# Patient Record
Sex: FEMALE | Race: WHITE | Employment: FULL TIME | URBAN - METROPOLITAN AREA
[De-identification: names, ages, dates, MRNs, and addresses within clinical notes are randomized per-mention and may not be internally consistent; named-entity substitution may affect disease eponyms.]

---

## 2017-06-06 PROCEDURE — 86376 MICROSOMAL ANTIBODY EACH: CPT | Performed by: FAMILY MEDICINE

## 2017-06-21 PROCEDURE — 88175 CYTOPATH C/V AUTO FLUID REDO: CPT | Performed by: FAMILY MEDICINE

## 2017-06-21 PROCEDURE — 87624 HPV HI-RISK TYP POOLED RSLT: CPT | Performed by: FAMILY MEDICINE

## 2017-06-22 PROCEDURE — 80074 ACUTE HEPATITIS PANEL: CPT | Performed by: FAMILY MEDICINE

## 2017-07-04 PROBLEM — E08.65 DIABETES MELLITUS DUE TO UNDERLYING CONDITION, UNCONTROLLED, WITH HYPERGLYCEMIA, WITHOUT LONG-TERM CURRENT USE OF INSULIN (HCC): Status: ACTIVE | Noted: 2017-07-04

## 2017-07-10 PROCEDURE — 82103 ALPHA-1-ANTITRYPSIN TOTAL: CPT | Performed by: INTERNAL MEDICINE

## 2017-07-10 PROCEDURE — 83516 IMMUNOASSAY NONANTIBODY: CPT | Performed by: INTERNAL MEDICINE

## 2017-07-10 PROCEDURE — 82390 ASSAY OF CERULOPLASMIN: CPT | Performed by: INTERNAL MEDICINE

## 2017-07-10 PROCEDURE — 82784 ASSAY IGA/IGD/IGG/IGM EACH: CPT | Performed by: INTERNAL MEDICINE

## 2017-08-02 PROBLEM — E11.9 DIABETES MELLITUS TYPE 2 WITHOUT RETINOPATHY (HCC): Status: ACTIVE | Noted: 2017-08-02

## 2017-08-19 PROCEDURE — 88305 TISSUE EXAM BY PATHOLOGIST: CPT | Performed by: INTERNAL MEDICINE

## 2018-01-02 PROCEDURE — 82570 ASSAY OF URINE CREATININE: CPT | Performed by: INTERNAL MEDICINE

## 2018-01-02 PROCEDURE — 82043 UR ALBUMIN QUANTITATIVE: CPT | Performed by: INTERNAL MEDICINE

## 2018-09-06 ENCOUNTER — APPOINTMENT (OUTPATIENT)
Dept: ULTRASOUND IMAGING | Facility: HOSPITAL | Age: 58
DRG: 607 | End: 2018-09-06
Attending: EMERGENCY MEDICINE
Payer: COMMERCIAL

## 2018-09-06 ENCOUNTER — HOSPITAL ENCOUNTER (INPATIENT)
Facility: HOSPITAL | Age: 58
LOS: 2 days | Discharge: HOME | DRG: 607 | End: 2018-09-08
Attending: EMERGENCY MEDICINE | Admitting: HOSPITALIST
Payer: COMMERCIAL

## 2018-09-06 DIAGNOSIS — L03.116 LEFT LEG CELLULITIS: Primary | ICD-10-CM

## 2018-09-06 DIAGNOSIS — D61.818 PANCYTOPENIA (HCC): ICD-10-CM

## 2018-09-06 LAB
ANION GAP SERPL CALC-SCNC: 7 MMOL/L (ref 0–18)
BASOPHILS # BLD AUTO: 0.01 X10(3) UL (ref 0–0.1)
BASOPHILS NFR BLD AUTO: 0.3 %
BUN BLD-MCNC: 9 MG/DL (ref 8–20)
BUN/CREAT SERPL: 10.5 (ref 10–20)
CALCIUM BLD-MCNC: 8.8 MG/DL (ref 8.3–10.3)
CHLORIDE SERPL-SCNC: 102 MMOL/L (ref 101–111)
CO2 SERPL-SCNC: 24 MMOL/L (ref 22–32)
CREAT BLD-MCNC: 0.86 MG/DL (ref 0.55–1.02)
EOSINOPHIL # BLD AUTO: 0.03 X10(3) UL (ref 0–0.3)
EOSINOPHIL NFR BLD AUTO: 1 %
ERYTHROCYTE [DISTWIDTH] IN BLOOD BY AUTOMATED COUNT: 14.6 % (ref 11.5–16)
GLUCOSE BLD-MCNC: 114 MG/DL (ref 70–99)
HCT VFR BLD AUTO: 32.5 % (ref 34–50)
HGB BLD-MCNC: 10.8 G/DL (ref 12–16)
IMMATURE GRANULOCYTE COUNT: 0.01 X10(3) UL (ref 0–1)
IMMATURE GRANULOCYTE RATIO %: 0.3 %
LYMPHOCYTES # BLD AUTO: 0.47 X10(3) UL (ref 0.9–4)
LYMPHOCYTES NFR BLD AUTO: 15 %
MCH RBC QN AUTO: 30.7 PG (ref 27–33.2)
MCHC RBC AUTO-ENTMCNC: 33.2 G/DL (ref 31–37)
MCV RBC AUTO: 92.3 FL (ref 81–100)
MONOCYTES # BLD AUTO: 0.52 X10(3) UL (ref 0.1–1)
MONOCYTES NFR BLD AUTO: 16.6 %
NEUTROPHIL ABS PRELIM: 2.1 X10 (3) UL (ref 1.3–6.7)
NEUTROPHILS # BLD AUTO: 2.1 X10(3) UL (ref 1.3–6.7)
NEUTROPHILS NFR BLD AUTO: 66.8 %
OSMOLALITY SERPL CALC.SUM OF ELEC: 276 MOSM/KG (ref 275–295)
PLATELET # BLD AUTO: 59 10(3)UL (ref 150–450)
POTASSIUM SERPL-SCNC: 4 MMOL/L (ref 3.6–5.1)
RBC # BLD AUTO: 3.52 X10(6)UL (ref 3.8–5.1)
RED CELL DISTRIBUTION WIDTH-SD: 49.3 FL (ref 35.1–46.3)
SODIUM SERPL-SCNC: 133 MMOL/L (ref 136–144)
WBC # BLD AUTO: 3.1 X10(3) UL (ref 4–13)

## 2018-09-06 PROCEDURE — 87040 BLOOD CULTURE FOR BACTERIA: CPT | Performed by: EMERGENCY MEDICINE

## 2018-09-06 PROCEDURE — 80048 BASIC METABOLIC PNL TOTAL CA: CPT | Performed by: EMERGENCY MEDICINE

## 2018-09-06 PROCEDURE — 99285 EMERGENCY DEPT VISIT HI MDM: CPT

## 2018-09-06 PROCEDURE — 36415 COLL VENOUS BLD VENIPUNCTURE: CPT

## 2018-09-06 PROCEDURE — S0077 INJECTION, CLINDAMYCIN PHOSP: HCPCS | Performed by: EMERGENCY MEDICINE

## 2018-09-06 PROCEDURE — 96365 THER/PROPH/DIAG IV INF INIT: CPT

## 2018-09-06 PROCEDURE — 83036 HEMOGLOBIN GLYCOSYLATED A1C: CPT | Performed by: HOSPITALIST

## 2018-09-06 PROCEDURE — 85025 COMPLETE CBC W/AUTO DIFF WBC: CPT | Performed by: EMERGENCY MEDICINE

## 2018-09-06 PROCEDURE — 82728 ASSAY OF FERRITIN: CPT | Performed by: INTERNAL MEDICINE

## 2018-09-06 PROCEDURE — 93971 EXTREMITY STUDY: CPT | Performed by: EMERGENCY MEDICINE

## 2018-09-06 PROCEDURE — 83540 ASSAY OF IRON: CPT | Performed by: INTERNAL MEDICINE

## 2018-09-06 PROCEDURE — 83550 IRON BINDING TEST: CPT | Performed by: INTERNAL MEDICINE

## 2018-09-06 RX ORDER — CLINDAMYCIN PHOSPHATE 600 MG/50ML
600 INJECTION INTRAVENOUS ONCE
Status: COMPLETED | OUTPATIENT
Start: 2018-09-06 | End: 2018-09-06

## 2018-09-06 RX ORDER — SULFAMETHOXAZOLE AND TRIMETHOPRIM 800; 160 MG/1; MG/1
1 TABLET ORAL 2 TIMES DAILY
Status: ON HOLD | COMMUNITY
End: 2018-09-08

## 2018-09-06 RX ORDER — MONTELUKAST SODIUM 10 MG/1
10 TABLET ORAL DAILY
COMMUNITY

## 2018-09-06 RX ORDER — ATORVASTATIN CALCIUM 10 MG/1
10 TABLET, FILM COATED ORAL DAILY
COMMUNITY
End: 2019-07-16

## 2018-09-06 RX ORDER — MORPHINE SULFATE 4 MG/ML
1 INJECTION, SOLUTION INTRAMUSCULAR; INTRAVENOUS EVERY 2 HOUR PRN
Status: DISCONTINUED | OUTPATIENT
Start: 2018-09-06 | End: 2018-09-08

## 2018-09-06 RX ORDER — LEVOTHYROXINE SODIUM 0.05 MG/1
50 TABLET ORAL
COMMUNITY
End: 2018-11-16

## 2018-09-06 RX ORDER — ONDANSETRON 2 MG/ML
4 INJECTION INTRAMUSCULAR; INTRAVENOUS EVERY 6 HOURS PRN
Status: DISCONTINUED | OUTPATIENT
Start: 2018-09-06 | End: 2018-09-08

## 2018-09-06 RX ORDER — DIPHENHYDRAMINE HCL 25 MG
25 CAPSULE ORAL EVERY 6 HOURS PRN
Status: DISCONTINUED | OUTPATIENT
Start: 2018-09-06 | End: 2018-09-08

## 2018-09-06 RX ORDER — CLINDAMYCIN PHOSPHATE 600 MG/50ML
600 INJECTION INTRAVENOUS EVERY 8 HOURS
Status: DISCONTINUED | OUTPATIENT
Start: 2018-09-07 | End: 2018-09-06

## 2018-09-06 RX ORDER — ACETAMINOPHEN 325 MG/1
650 TABLET ORAL EVERY 6 HOURS PRN
Status: DISCONTINUED | OUTPATIENT
Start: 2018-09-06 | End: 2018-09-08

## 2018-09-06 RX ORDER — ENOXAPARIN SODIUM 100 MG/ML
40 INJECTION SUBCUTANEOUS NIGHTLY
Status: DISCONTINUED | OUTPATIENT
Start: 2018-09-06 | End: 2018-09-06

## 2018-09-06 RX ORDER — MORPHINE SULFATE 4 MG/ML
2 INJECTION, SOLUTION INTRAMUSCULAR; INTRAVENOUS EVERY 2 HOUR PRN
Status: DISCONTINUED | OUTPATIENT
Start: 2018-09-06 | End: 2018-09-08

## 2018-09-06 RX ORDER — METOCLOPRAMIDE HYDROCHLORIDE 5 MG/ML
10 INJECTION INTRAMUSCULAR; INTRAVENOUS EVERY 8 HOURS PRN
Status: DISCONTINUED | OUTPATIENT
Start: 2018-09-06 | End: 2018-09-08

## 2018-09-06 RX ORDER — ACETAMINOPHEN 500 MG
1000 TABLET ORAL ONCE
Status: COMPLETED | OUTPATIENT
Start: 2018-09-06 | End: 2018-09-06

## 2018-09-06 RX ORDER — CEPHALEXIN 500 MG/1
500 CAPSULE ORAL 3 TIMES DAILY
Status: ON HOLD | COMMUNITY
End: 2018-09-08

## 2018-09-06 RX ORDER — FLUTICASONE PROPIONATE AND SALMETEROL 500; 50 UG/1; UG/1
1 POWDER RESPIRATORY (INHALATION) 2 TIMES DAILY
COMMUNITY
End: 2019-06-10 | Stop reason: ALTCHOICE

## 2018-09-06 RX ORDER — ENOXAPARIN SODIUM 100 MG/ML
40 INJECTION SUBCUTANEOUS NIGHTLY
Status: DISCONTINUED | OUTPATIENT
Start: 2018-09-06 | End: 2018-09-06 | Stop reason: SDUPTHER

## 2018-09-06 RX ORDER — DIPHENHYDRAMINE HYDROCHLORIDE 25 MG/1
1 TABLET ORAL DAILY
COMMUNITY
End: 2020-11-19 | Stop reason: ALTCHOICE

## 2018-09-06 RX ORDER — MORPHINE SULFATE 4 MG/ML
4 INJECTION, SOLUTION INTRAMUSCULAR; INTRAVENOUS EVERY 2 HOUR PRN
Status: DISCONTINUED | OUTPATIENT
Start: 2018-09-06 | End: 2018-09-08

## 2018-09-06 NOTE — ED INITIAL ASSESSMENT (HPI)
Patient reports she was dx with cellulitis to her left lower leg about 9 days ago. Reports she has been on oral antibiotics, thought it was getting better until today.     + redness and swelling to left lower leg

## 2018-09-06 NOTE — ED PROVIDER NOTES
Patient Seen in: [facility] Emergency Department    History   Patient presents with:  Cellulitis (integumentary, infectious)    Stated Complaint: cellulitis    HPI    Patient has history of diabetes.   Patient notes that her symptoms started about 6 as noted above.     Physical Exam   ED Triage Vitals [09/06/18 1841]  BP: 136/67  Pulse: 106  Resp: 18  Temp: 100.4 °F (38 °C)  Temp src: Oral  SpO2: 98 %  O2 Device: None (Room air)    Current:/55   Pulse 94   Temp 98.4 °F (36.9 °C) (Oral)   Resp 18 RDW-SD 49.3 (*)     Lymphocyte Absolute 0.47 (*)     All other components within normal limits   CBC WITH DIFFERENTIAL WITH PLATELET    Narrative: The following orders were created for panel order CBC WITH DIFFERENTIAL WITH PLATELET.   Procedure [facility]    Disposition:  Admit  9/6/2018  8:40 pm    Follow-up:  No follow-up provider specified.       Medications Prescribed:  Current Discharge Medication List        Present on Admission  Date Reviewed: 7/26/2018          ICD-10-CM Noted POA    Left leg c

## 2018-09-07 ENCOUNTER — APPOINTMENT (OUTPATIENT)
Dept: ULTRASOUND IMAGING | Facility: HOSPITAL | Age: 58
DRG: 607 | End: 2018-09-07
Attending: INTERNAL MEDICINE
Payer: COMMERCIAL

## 2018-09-07 LAB
BASOPHILS # BLD AUTO: 0.01 X10(3) UL (ref 0–0.1)
BASOPHILS NFR BLD AUTO: 0.4 %
DEPRECATED HBV CORE AB SER IA-ACNC: 48.9 NG/ML (ref 18–340)
EOSINOPHIL # BLD AUTO: 0.05 X10(3) UL (ref 0–0.3)
EOSINOPHIL NFR BLD AUTO: 2.2 %
ERYTHROCYTE [DISTWIDTH] IN BLOOD BY AUTOMATED COUNT: 14.6 % (ref 11.5–16)
EST. AVERAGE GLUCOSE BLD GHB EST-MCNC: 137 MG/DL (ref 68–126)
GLUCOSE BLD-MCNC: 142 MG/DL (ref 65–99)
GLUCOSE BLD-MCNC: 158 MG/DL (ref 65–99)
GLUCOSE BLD-MCNC: 210 MG/DL (ref 65–99)
HBA1C MFR BLD HPLC: 6.4 % (ref ?–5.7)
HCT VFR BLD AUTO: 31.7 % (ref 34–50)
HGB BLD-MCNC: 10.2 G/DL (ref 12–16)
IMMATURE GRANULOCYTE COUNT: 0 X10(3) UL (ref 0–1)
IMMATURE GRANULOCYTE RATIO %: 0 %
IRON SATURATION: 15 % (ref 15–50)
IRON: 74 UG/DL (ref 28–170)
LYMPHOCYTES # BLD AUTO: 0.59 X10(3) UL (ref 0.9–4)
LYMPHOCYTES NFR BLD AUTO: 26.2 %
MCH RBC QN AUTO: 30.6 PG (ref 27–33.2)
MCHC RBC AUTO-ENTMCNC: 32.2 G/DL (ref 31–37)
MCV RBC AUTO: 95.2 FL (ref 81–100)
MONOCYTES # BLD AUTO: 0.57 X10(3) UL (ref 0.1–1)
MONOCYTES NFR BLD AUTO: 25.3 %
NEUTROPHIL ABS PRELIM: 1.03 X10 (3) UL (ref 1.3–6.7)
NEUTROPHILS # BLD AUTO: 1.03 X10(3) UL (ref 1.3–6.7)
NEUTROPHILS NFR BLD AUTO: 45.9 %
PLATELET # BLD AUTO: 57 10(3)UL (ref 150–450)
RBC # BLD AUTO: 3.33 X10(6)UL (ref 3.8–5.1)
RED CELL DISTRIBUTION WIDTH-SD: 50.9 FL (ref 35.1–46.3)
RETIC ABS CALC AUTO: 44.5 X10(3) UL (ref 22.5–147.5)
RETIC IRF CALC: 0.1 RATIO (ref 0.09–0.3)
RETIC%: 1.3 % (ref 0.5–2.5)
RETICULOCYTE HEMOGLOBIN EQUIVALENT: 33.7 PG (ref 28.2–36.3)
TOTAL IRON BINDING CAPACITY: 508 UG/DL (ref 240–450)
TRANSFERRIN SERPL-MCNC: 341 MG/DL (ref 200–360)
WBC # BLD AUTO: 2.3 X10(3) UL (ref 4–13)

## 2018-09-07 PROCEDURE — 76705 ECHO EXAM OF ABDOMEN: CPT | Performed by: INTERNAL MEDICINE

## 2018-09-07 PROCEDURE — 82962 GLUCOSE BLOOD TEST: CPT

## 2018-09-07 PROCEDURE — 85045 AUTOMATED RETICULOCYTE COUNT: CPT | Performed by: INTERNAL MEDICINE

## 2018-09-07 PROCEDURE — 85025 COMPLETE CBC W/AUTO DIFF WBC: CPT | Performed by: HOSPITALIST

## 2018-09-07 RX ORDER — LEVOTHYROXINE SODIUM 0.05 MG/1
50 TABLET ORAL
Status: DISCONTINUED | OUTPATIENT
Start: 2018-09-07 | End: 2018-09-08

## 2018-09-07 RX ORDER — MONTELUKAST SODIUM 10 MG/1
10 TABLET ORAL DAILY
Status: DISCONTINUED | OUTPATIENT
Start: 2018-09-07 | End: 2018-09-08

## 2018-09-07 RX ORDER — ATORVASTATIN CALCIUM 10 MG/1
10 TABLET, FILM COATED ORAL NIGHTLY
Status: DISCONTINUED | OUTPATIENT
Start: 2018-09-07 | End: 2018-09-08

## 2018-09-07 RX ORDER — DEXTROSE MONOHYDRATE 25 G/50ML
50 INJECTION, SOLUTION INTRAVENOUS
Status: DISCONTINUED | OUTPATIENT
Start: 2018-09-07 | End: 2018-09-08

## 2018-09-07 RX ORDER — HYDROXYZINE HYDROCHLORIDE 25 MG/1
25 TABLET, FILM COATED ORAL 3 TIMES DAILY
Status: DISCONTINUED | OUTPATIENT
Start: 2018-09-07 | End: 2018-09-08

## 2018-09-07 RX ORDER — DIPHENHYDRAMINE HYDROCHLORIDE 25 MG/1
1 TABLET ORAL DAILY
Status: DISCONTINUED | OUTPATIENT
Start: 2018-09-07 | End: 2018-09-07 | Stop reason: RX

## 2018-09-07 RX ORDER — FAMOTIDINE 20 MG/1
20 TABLET ORAL 2 TIMES DAILY
Status: DISCONTINUED | OUTPATIENT
Start: 2018-09-07 | End: 2018-09-08

## 2018-09-07 NOTE — CONSULTS
[facility]  Report of Consultation    Paola Eli Patient Status:  Inpatient    1960 MRN [id number]   [facility] 3NE-A Attending Emma Dodge MD   Hosp Day # 1 PCP Bel Singer MD     REASON FOR CONSULTATION: • Diabetes Mother    • Heart Disorder Mother      cabg   • Heart Disorder Maternal Grandmother    • Cancer Maternal Grandmother      small intestine cancer   • Lipids Sister    • Diabetes Brother      diet controlled diabetes   • Breast Cancer Cousin is clear. Neck: No JVD. No palpable lymphadenopathy. Neck is supple. Chest: Clear to auscultation. Heart: Regular rate and rhythm. Abdomen: Soft, non tender with good bowel sounds. Extremities: Pedal pulses are present. No edema.    Neurological: 0.10 x10(3) uL   Immature Granulocyte Absolute 0.01 0.00 - 1.00 x10(3) uL   Neutrophil % 66.8 %   Lymphocyte % 15.0 %   Monocyte % 16.6 %   Eosinophil % 1.0 %   Basophil % 0.3 %   Immature Granulocyte % 0.3 %         Component WBC RBC Hemoglobin   Latest R Imaging:    -      PROBLEM LIST:     Patient Active Problem List:     Asthma     Scleroderma ([address]     Hypothyroid     GERD (gastroesophageal reflux disease)     BMI 31.0-31.9,adult     Encounter for insertion of mirena IUD     Diabetes mellitus du

## 2018-09-07 NOTE — PROGRESS NOTES
[facility] dosing service    Initial Pharmacokinetic Consult for Vancomycin Dosing     Karey Brown is a 63year old female who is being treated for cellulitis.   Pharmacy has been asked to dose Vancomycin by Dr. Joseph Osullivan    She is allergic to cleoci appreciate the opportunity to assist in her care.     MONA López [facility]  9/7/2018  12:45 AM  [address] [place] [address] Extension: [phone]

## 2018-09-07 NOTE — PLAN OF CARE
A/O x 4.  Vanco Q 12 per MAR- first dose infusing  Denies pain  Benadryl PO PRN with relief of itching to LLE and chest/back  Ice pack to LLE  Carb controlled diet  Up ad bonifacio    METABOLIC/FLUID AND ELECTROLYTES - ADULT    • Glucose maintained within prescr

## 2018-09-07 NOTE — H&P
.  CC: Patient presents with:  Cellulitis (integumentary, infectious)       PCP: Orville Mcdowell MD    History of Present Illness: Patient is a 63year old female with diabetes who presented initially with redness and discomfort in LLE about 11 day Comment: Grade 1 esophageal varices, portal                hypertensive gastropathy, SB Bx taken  No date: LAPAROSCOPY PROCEDURE UNLISTED      Comment: ovarian cyst     ALL:    No Known Allergies           Home Medications:    Outpatient Prescriptions M 100.4 °F (38 °C) (Oral)   Resp 18   Ht 162.6 cm (5' 4\")   Wt 180 lb (81.6 kg)   SpO2 98%   BMI 30.90 kg/m²     Gen- NAD, appears stated age  [de-identified]- NCAT, anicteric sclera, MMM, OP clear  Lymph- no cervical LAD  CV- RRR no murmurs.  No DANETTE  Lungs- CTAB, goo effect of bactrim or related to ongoing infection    DM- normally on metformin and januvia. Hold.  accuchecks and SSI    Hypothyroid- levothyroxine    Hl- statin    Asthma- on chema Ladd MD  [facility] Hospitalist  Pager [phone]  Answering S

## 2018-09-07 NOTE — CONSULTS
INFECTIOUS DISEASE CONSULT NOTE    Breanne Negrete Patient Status:  Inpatient    1960 MRN [id number]   [facility] 3NE-A Attending Armani Navarro MD   Hosp Day # 1 PCP [place] Route diverticulosis  08/19/2017: EGD      Comment: Grade 1 esophageal varices, portal                hypertensive gastropathy, SB Bx taken  No date: LAPAROSCOPY PROCEDURE UNLISTED      Comment: ovarian cyst  Family History   Problem Relation Age of Onset   • Parag Larsen (PEPCID) tab 20 mg, 20 mg, Oral, BID  •  acetaminophen (TYLENOL) tab 650 mg, 650 mg, Oral, Q6H PRN  •  morphINE sulfate (PF) 4 MG/ML injection 1 mg, 1 mg, Intravenous, Q2H PRN **OR** morphINE sulfate (PF) 4 MG/ML injection 2 mg, 2 mg, Intravenous, Q2H PRN 9/6/2018  PROCEDURE:  US VENOUS DOPPLER LEG LEFT - DIAG IMG (CPT=93971)  COMPARISON:  None. INDICATIONS:  Pain swelling left leg  TECHNIQUE:  Real time, grey scale, and duplex ultrasound was used to evaluate the lower extremity venous system.  B-mode two-d she thinks that it was much better prior to the beginning of the rash.  Low grade fevers on admission could just be part of adverse reaction to abx    PLAN:    -cont empiric vanco for now but if remains afebrile and leg cellulitis seems resolved may not nee

## 2018-09-07 NOTE — PROGRESS NOTES
After IV Cleocin in ER pt noted to have hives on her chest that is itchy- noted by staff. [facility] hospitalist notified. Will continue to monitor.

## 2018-09-07 NOTE — PROGRESS NOTES
Pharmacy Note: Dietary Supplement Discontinuation Per Policy    Biotin has been discontinued on Stephani  per policy. This supplement may be restarted upon discharge using the medication reconciliation process.     Thank you,   Xiomara Cobb, [facility]

## 2018-09-07 NOTE — PAYOR COMM NOTE
--------------  ADMISSION REVIEW     Payor: [employer] [facility]  Subscriber #:  [id number]  Authorization Number: N/A    Admit date: 9/6/18  Admit time: 2104       Admitting Physician: Keaton Hollingsworth MD  Attending Physician:  Keaton Hollingsworth MD  Primary Care Physician diverticulosis  08/19/2017: EGD      Comment: Grade 1 esophageal varices, portal                hypertensive gastropathy, SB Bx taken  No date: LAPAROSCOPY PROCEDURE UNLISTED      Comment: ovarian cyst        Smoking status: Never Smoker petechial-like rash noted. No purpura. No ulcerations. No exudates. Dorsalis pedis pulse intact. Strength in the foot is excellent. The more proximal calf and popliteal fossa are nonswollen nontender. No tenderness or swelling over the knee.   No pollo Doppler  CONCLUSION:  Negative for DVT left leg  Admission disposition: 9/6/2018  8:40 PM         As noted above, patient has pancytopenia.   Disorders that produce disturbances and all 3 blood cell lines include myeloproliferative syndromes and myelodyspla female with diabetes who presented initially with redness and discomfort in LLE about 11 days ago. She describes it as a solid red band that had been across her lower shin above her ankle.  Prescribed bactrim by urgent  Care initially and then returned back Comment: ovarian cyst      ALL:     No Known Allergies            Home Medications:     Active Medications      Outpatient Prescriptions Marked as Taking for the 9/6/18 encounter [facility] Encounter):  Sulfamethoxazole-TMP -160 MG Oral Tab per tablet cm (5' 4\")   Wt 180 lb (81.6 kg)   SpO2 98%   BMI 30.90 kg/m²      Gen- NAD, appears stated age  [de-identified]- NCAT, anicteric sclera, MMM, OP clear  Lymph- no cervical LAD  CV- RRR no murmurs.  No DANETTE  Lungs- CTAB, good respiratory effort  Abd- soft, ntnd, no or related to ongoing infection     DM- normally on metformin and januvia. Hold.  accuchecks and SSI     Hypothyroid- levothyroxine     Hl- statin     Asthma- on chema Plummer MD  [facility] Hospitalist  Pager [phone]  Answering Service number

## 2018-09-08 VITALS
BODY MASS INDEX: 29.62 KG/M2 | RESPIRATION RATE: 16 BRPM | HEART RATE: 87 BPM | DIASTOLIC BLOOD PRESSURE: 63 MMHG | WEIGHT: 173.5 LBS | TEMPERATURE: 100 F | SYSTOLIC BLOOD PRESSURE: 113 MMHG | HEIGHT: 64 IN | OXYGEN SATURATION: 99 %

## 2018-09-08 LAB
BASOPHILS # BLD AUTO: 0.01 X10(3) UL (ref 0–0.1)
BASOPHILS NFR BLD AUTO: 0.4 %
CREAT BLD-MCNC: 0.69 MG/DL (ref 0.55–1.02)
EOSINOPHIL # BLD AUTO: 0.13 X10(3) UL (ref 0–0.3)
EOSINOPHIL NFR BLD AUTO: 5.2 %
ERYTHROCYTE [DISTWIDTH] IN BLOOD BY AUTOMATED COUNT: 14.5 % (ref 11.5–16)
GLUCOSE BLD-MCNC: 125 MG/DL (ref 65–99)
GLUCOSE BLD-MCNC: 125 MG/DL (ref 65–99)
HCT VFR BLD AUTO: 31.7 % (ref 34–50)
HGB BLD-MCNC: 9.9 G/DL (ref 12–16)
IMMATURE GRANULOCYTE COUNT: 0.01 X10(3) UL (ref 0–1)
IMMATURE GRANULOCYTE RATIO %: 0.4 %
LYMPHOCYTES # BLD AUTO: 0.72 X10(3) UL (ref 0.9–4)
LYMPHOCYTES NFR BLD AUTO: 28.6 %
MCH RBC QN AUTO: 29.6 PG (ref 27–33.2)
MCHC RBC AUTO-ENTMCNC: 31.2 G/DL (ref 31–37)
MCV RBC AUTO: 94.9 FL (ref 81–100)
MONOCYTES # BLD AUTO: 0.62 X10(3) UL (ref 0.1–1)
MONOCYTES NFR BLD AUTO: 24.6 %
NEUTROPHIL ABS PRELIM: 1.03 X10 (3) UL (ref 1.3–6.7)
NEUTROPHILS # BLD AUTO: 1.03 X10(3) UL (ref 1.3–6.7)
NEUTROPHILS NFR BLD AUTO: 40.8 %
PLATELET # BLD AUTO: 64 10(3)UL (ref 150–450)
RBC # BLD AUTO: 3.34 X10(6)UL (ref 3.8–5.1)
RED CELL DISTRIBUTION WIDTH-SD: 50.2 FL (ref 35.1–46.3)
VANCOMYCIN TROUGH SERPL-MCNC: 11.1 UG/ML (ref 10–20)
WBC # BLD AUTO: 2.5 X10(3) UL (ref 4–13)

## 2018-09-08 PROCEDURE — 83883 ASSAY NEPHELOMETRY NOT SPEC: CPT | Performed by: INTERNAL MEDICINE

## 2018-09-08 PROCEDURE — 87389 HIV-1 AG W/HIV-1&-2 AB AG IA: CPT | Performed by: INTERNAL MEDICINE

## 2018-09-08 PROCEDURE — 82565 ASSAY OF CREATININE: CPT | Performed by: HOSPITALIST

## 2018-09-08 PROCEDURE — 85025 COMPLETE CBC W/AUTO DIFF WBC: CPT | Performed by: HOSPITALIST

## 2018-09-08 PROCEDURE — 84165 PROTEIN E-PHORESIS SERUM: CPT | Performed by: INTERNAL MEDICINE

## 2018-09-08 PROCEDURE — 82962 GLUCOSE BLOOD TEST: CPT

## 2018-09-08 PROCEDURE — 86334 IMMUNOFIX E-PHORESIS SERUM: CPT | Performed by: INTERNAL MEDICINE

## 2018-09-08 PROCEDURE — 80202 ASSAY OF VANCOMYCIN: CPT | Performed by: HOSPITALIST

## 2018-09-08 RX ORDER — PREDNISONE 20 MG/1
40 TABLET ORAL
Qty: 8 TABLET | Refills: 0 | Status: SHIPPED | OUTPATIENT
Start: 2018-09-09 | End: 2018-09-13

## 2018-09-08 RX ORDER — FLUCONAZOLE 150 MG/1
150 TABLET ORAL ONCE
Qty: 1 TABLET | Refills: 0 | Status: SHIPPED | OUTPATIENT
Start: 2018-09-08 | End: 2018-09-08

## 2018-09-08 RX ORDER — HYDROXYZINE HYDROCHLORIDE 25 MG/1
25 TABLET, FILM COATED ORAL 3 TIMES DAILY
Qty: 30 TABLET | Refills: 0 | Status: SHIPPED | OUTPATIENT
Start: 2018-09-08 | End: 2019-01-04 | Stop reason: ALTCHOICE

## 2018-09-08 RX ORDER — LEVOFLOXACIN 500 MG/1
500 TABLET, FILM COATED ORAL DAILY
Qty: 7 TABLET | Refills: 0 | Status: SHIPPED | OUTPATIENT
Start: 2018-09-08 | End: 2018-09-15

## 2018-09-08 RX ORDER — PREDNISONE 20 MG/1
40 TABLET ORAL
Status: DISCONTINUED | OUTPATIENT
Start: 2018-09-08 | End: 2018-09-08

## 2018-09-08 NOTE — PLAN OF CARE
PT VERBALIZED UNDERSTANDING OF D/C MEDS, F/U APPOINTMNETS, SIDE EFFECTS OF MEDS. IV D/C'D WITH CATHETER INTACT. PT TO [facility] LOBBY VIA WHEELCHAIR AND TRANSPORT.

## 2018-09-08 NOTE — PROGRESS NOTES
[facility]                INFECTIOUS DISEASE PROGRESS NOTE    Ken Light Patient Status:  Inpatient    1960 MRN [id number]   [facility] 3NE-A Attending Nasir Tomlinson MD   Hosp Day # 2 PCP Kayleigh Yarbrough MD steroids    2.  LLE cellulits per history  Now with superimposed rash  If dc home po austin Joshi MD  [facility]  [phone]

## 2018-09-08 NOTE — PROGRESS NOTES
[facility] Hospitalist note    PCP: Paulie Ruiz MD    Chief Complaint:  Rash, LLE cellulitis    SUBJECTIVE:  Feeling well but still very itchy. Redness continues to dissipate in LLE but other lesions including chest/back remain the same.     OBJECTI predniSONE  40 mg Oral Daily with breakfast   • vancomycin  15 mg/kg Intravenous Q12H   • Insulin Aspart Pen  1-5 Units Subcutaneous TID CC and HS   • atorvastatin  10 mg Oral Nightly   • Fluticasone Furoate-Vilanterol  1 puff Inhalation Daily   • Levothyr

## 2018-09-08 NOTE — PLAN OF CARE
Patient still with hives/rash/itching. Benadryl/Atarax given for itching. Patient doesn't feel like the rash is getting better and is still extremely itchy. IV vanco continued. Trough level due before next dose. Patient currently resting.     METABOLIC/FLUI

## 2018-09-21 LAB
ALBUMIN SERPL-MCNC: 3.55 G/DL (ref 3.5–4.8)
ALBUMIN/GLOB SERPL: 1.13 {RATIO}
ALPHA1 GLOB SERPL ELPH-MCNC: 0.25 G/DL (ref 0.1–0.3)
ALPHA2 GLOB SERPL ELPH-MCNC: 0.67 G/DL (ref 0.6–1)
B-GLOBULIN SERPL ELPH-MCNC: 0.78 G/DL (ref 0.7–1.2)
GAMMA GLOB SERPL ELPH-MCNC: 1.45 G/DL (ref 0.6–1.6)
KAPPA FREE LIGHT CHAIN: 5.63 MG/DL (ref 0.33–1.94)
KAPPA/LAMBDA FLC RATIO: 1.65 (ref 0.26–1.65)
LAMBDA FREE LIGHT CHAIN: 3.41 MG/DL (ref 0.57–2.63)
MAI PROTEIN SERPL-MCNC: 6.7 G/DL (ref 6.1–8.3)

## 2019-01-04 PROCEDURE — 82043 UR ALBUMIN QUANTITATIVE: CPT | Performed by: FAMILY MEDICINE

## 2019-01-04 PROCEDURE — 82570 ASSAY OF URINE CREATININE: CPT | Performed by: FAMILY MEDICINE

## 2019-01-08 PROCEDURE — 83010 ASSAY OF HAPTOGLOBIN QUANT: CPT | Performed by: INTERNAL MEDICINE

## 2019-01-11 PROBLEM — E11.65 UNCONTROLLED TYPE 2 DIABETES MELLITUS WITH HYPERGLYCEMIA, WITHOUT LONG-TERM CURRENT USE OF INSULIN (HCC): Status: ACTIVE | Noted: 2019-01-11

## 2019-05-16 ENCOUNTER — APPOINTMENT (OUTPATIENT)
Dept: GENERAL RADIOLOGY | Age: 59
End: 2019-05-16
Attending: PHYSICIAN ASSISTANT
Payer: COMMERCIAL

## 2019-05-16 ENCOUNTER — APPOINTMENT (OUTPATIENT)
Dept: ULTRASOUND IMAGING | Age: 59
End: 2019-05-16
Attending: PHYSICIAN ASSISTANT
Payer: COMMERCIAL

## 2019-05-16 ENCOUNTER — HOSPITAL ENCOUNTER (OUTPATIENT)
Age: 59
Discharge: HOME | End: 2019-05-16
Payer: COMMERCIAL

## 2019-05-16 VITALS
HEART RATE: 89 BPM | TEMPERATURE: 99 F | SYSTOLIC BLOOD PRESSURE: 151 MMHG | DIASTOLIC BLOOD PRESSURE: 68 MMHG | RESPIRATION RATE: 17 BRPM | OXYGEN SATURATION: 98 %

## 2019-05-16 DIAGNOSIS — D64.9 ANEMIA, UNSPECIFIED TYPE: Primary | ICD-10-CM

## 2019-05-16 DIAGNOSIS — R22.41 LOCALIZED SWELLING OF RIGHT LOWER EXTREMITY: ICD-10-CM

## 2019-05-16 DIAGNOSIS — E11.65 TYPE 2 DIABETES MELLITUS WITH HYPERGLYCEMIA, WITHOUT LONG-TERM CURRENT USE OF INSULIN (HCC): ICD-10-CM

## 2019-05-16 PROCEDURE — 99204 OFFICE O/P NEW MOD 45 MIN: CPT

## 2019-05-16 PROCEDURE — 85025 COMPLETE CBC W/AUTO DIFF WBC: CPT | Performed by: PHYSICIAN ASSISTANT

## 2019-05-16 PROCEDURE — 93971 EXTREMITY STUDY: CPT | Performed by: PHYSICIAN ASSISTANT

## 2019-05-16 PROCEDURE — 80047 BASIC METABLC PNL IONIZED CA: CPT

## 2019-05-16 PROCEDURE — 84550 ASSAY OF BLOOD/URIC ACID: CPT | Performed by: PHYSICIAN ASSISTANT

## 2019-05-16 PROCEDURE — 73610 X-RAY EXAM OF ANKLE: CPT | Performed by: PHYSICIAN ASSISTANT

## 2019-05-16 PROCEDURE — 36415 COLL VENOUS BLD VENIPUNCTURE: CPT

## 2019-05-16 PROCEDURE — 99214 OFFICE O/P EST MOD 30 MIN: CPT

## 2019-05-16 NOTE — ED INITIAL ASSESSMENT (HPI)
Patient presents with 2 day h/o right lateral ankle swelling and redness started today.+cms. Palpable pulses. No h/o dvt. Denies shortness of breath.

## 2019-05-16 NOTE — ED PROVIDER NOTES
Patient Seen in: [facility] [place] [facility]    History   Patient presents with:  Swelling  Redness    Stated Complaint: leg redness    HPI    25-year-old female here with complaint of warmth redness and swelling to her right ankle that she note Yes      Alcohol/week: 0.0 oz      Comment: Social    Drug use: No      Review of Systems    Positive for stated complaint: leg redness  Other systems are as noted in HPI. Constitutional and vital signs reviewed.       All other systems reviewed and negati ISTAT CHEM8 CARTRIDGE - Abnormal; Notable for the following components:    ISTAT Creatinine 0.50 (*)     ISTAT Glucose 296 (*)     All other components within normal limits   URIC ACID, SERUM   CBC W AUTO DIFF   NOTE: [phone]  Xr Ankle (min 3 Views), Unremarkable without evidence of thrombus THROMBI:  None visible. COMPRESSION:  Normal compressibility, phasicity, and augmentation. OTHER:  Negative. CONCLUSION:  No evidence of deep venous thrombosis in the right lower extremity.    Dictated by: Mandeep Peterson Eliza Bertrand  [address] [facility]    Schedule an appointment as soon as possible for a visit           Medications Prescribed:  Discharge Medication List as of 5/16/2019  6:30 PM

## 2019-05-17 ENCOUNTER — TELEPHONE (OUTPATIENT)
Dept: URGENT CARE | Age: 59
End: 2019-05-17

## 2019-05-18 NOTE — ED NOTES
Called to patient and  verified for identification. Made aware of final CBC with auto diff result (confirmatory testing) and low uric acid result. Instructed to follow-up with Hematology-Oncology physician.   States did follow-up with Jaleesa Blake today and was diagnosed Cellulitis and was prescribed Augmentin

## 2019-05-25 ENCOUNTER — APPOINTMENT (OUTPATIENT)
Dept: ULTRASOUND IMAGING | Facility: HOSPITAL | Age: 59
End: 2019-05-25
Attending: EMERGENCY MEDICINE
Payer: COMMERCIAL

## 2019-05-25 ENCOUNTER — HOSPITAL ENCOUNTER (EMERGENCY)
Facility: HOSPITAL | Age: 59
Discharge: HOME | End: 2019-05-25
Attending: EMERGENCY MEDICINE
Payer: COMMERCIAL

## 2019-05-25 VITALS
BODY MASS INDEX: 29.02 KG/M2 | TEMPERATURE: 98 F | DIASTOLIC BLOOD PRESSURE: 92 MMHG | SYSTOLIC BLOOD PRESSURE: 151 MMHG | HEART RATE: 81 BPM | OXYGEN SATURATION: 99 % | HEIGHT: 64 IN | WEIGHT: 170 LBS | RESPIRATION RATE: 18 BRPM

## 2019-05-25 DIAGNOSIS — L03.115 CELLULITIS OF RIGHT LOWER EXTREMITY: Primary | ICD-10-CM

## 2019-05-25 PROCEDURE — 99284 EMERGENCY DEPT VISIT MOD MDM: CPT

## 2019-05-25 PROCEDURE — 93971 EXTREMITY STUDY: CPT | Performed by: EMERGENCY MEDICINE

## 2019-05-25 RX ORDER — DOXYCYCLINE HYCLATE 100 MG/1
100 CAPSULE ORAL 2 TIMES DAILY
Qty: 20 CAPSULE | Refills: 0 | Status: ON HOLD | OUTPATIENT
Start: 2019-05-25 | End: 2019-05-29

## 2019-05-25 NOTE — ED PROVIDER NOTES
Patient Seen in: [facility] Emergency Department    History   Patient presents with:  Cellulitis (integumentary, infectious)    Stated Complaint: celluitis right lower extremeity    HPI    50-year-old female here for redness of her right leg.   Elias Oh Temporal   SpO2 99 %   O2 Device None (Room air)       Current:BP (!) 151/92   Pulse 81   Temp 97.5 °F (36.4 °C) (Temporal)   Resp 18   Ht 162.6 cm (5' 4\")   Wt 77.1 kg   SpO2 99%   BMI 29.18 kg/m²         Physical Exam    Patient is alert orient x3 no ac

## 2019-05-28 ENCOUNTER — HOSPITAL ENCOUNTER (INPATIENT)
Facility: HOSPITAL | Age: 59
LOS: 1 days | Discharge: HOME | DRG: 378 | End: 2019-05-29
Attending: EMERGENCY MEDICINE | Admitting: INTERNAL MEDICINE
Payer: COMMERCIAL

## 2019-05-28 ENCOUNTER — ANESTHESIA EVENT (OUTPATIENT)
Dept: ENDOSCOPY | Facility: HOSPITAL | Age: 59
DRG: 378 | End: 2019-05-28
Payer: COMMERCIAL

## 2019-05-28 ENCOUNTER — ANESTHESIA (OUTPATIENT)
Dept: ENDOSCOPY | Facility: HOSPITAL | Age: 59
DRG: 378 | End: 2019-05-28
Payer: COMMERCIAL

## 2019-05-28 DIAGNOSIS — K92.2 UGI BLEED: Primary | ICD-10-CM

## 2019-05-28 DIAGNOSIS — F10.20 ALCOHOLISM (HCC): ICD-10-CM

## 2019-05-28 DIAGNOSIS — K92.0 HEMATEMESIS WITH NAUSEA: ICD-10-CM

## 2019-05-28 DIAGNOSIS — K29.01 ACUTE GASTRITIS WITH HEMORRHAGE, UNSPECIFIED GASTRITIS TYPE: ICD-10-CM

## 2019-05-28 DIAGNOSIS — I95.9 HYPOTENSION, UNSPECIFIED HYPOTENSION TYPE: ICD-10-CM

## 2019-05-28 PROBLEM — D64.9 ANEMIA: Status: ACTIVE | Noted: 2019-05-28

## 2019-05-28 PROBLEM — D69.6 THROMBOCYTOPENIA: Status: ACTIVE | Noted: 2019-05-28

## 2019-05-28 PROBLEM — D69.6 THROMBOCYTOPENIA (HCC): Status: ACTIVE | Noted: 2019-05-28

## 2019-05-28 PROBLEM — R73.9 HYPERGLYCEMIA: Status: ACTIVE | Noted: 2019-05-28

## 2019-05-28 PROBLEM — E87.1 HYPONATREMIA: Status: ACTIVE | Noted: 2019-05-28

## 2019-05-28 PROBLEM — R79.89 AZOTEMIA: Status: ACTIVE | Noted: 2019-05-28

## 2019-05-28 PROCEDURE — 82962 GLUCOSE BLOOD TEST: CPT

## 2019-05-28 PROCEDURE — 96365 THER/PROPH/DIAG IV INF INIT: CPT

## 2019-05-28 PROCEDURE — 82272 OCCULT BLD FECES 1-3 TESTS: CPT

## 2019-05-28 PROCEDURE — 87081 CULTURE SCREEN ONLY: CPT | Performed by: INTERNAL MEDICINE

## 2019-05-28 PROCEDURE — 85025 COMPLETE CBC W/AUTO DIFF WBC: CPT

## 2019-05-28 PROCEDURE — 96372 THER/PROPH/DIAG INJ SC/IM: CPT

## 2019-05-28 PROCEDURE — 80053 COMPREHEN METABOLIC PANEL: CPT

## 2019-05-28 PROCEDURE — 85025 COMPLETE CBC W/AUTO DIFF WBC: CPT | Performed by: EMERGENCY MEDICINE

## 2019-05-28 PROCEDURE — C9113 INJ PANTOPRAZOLE SODIUM, VIA: HCPCS | Performed by: INTERNAL MEDICINE

## 2019-05-28 PROCEDURE — 99291 CRITICAL CARE FIRST HOUR: CPT

## 2019-05-28 PROCEDURE — 86850 RBC ANTIBODY SCREEN: CPT | Performed by: EMERGENCY MEDICINE

## 2019-05-28 PROCEDURE — 86850 RBC ANTIBODY SCREEN: CPT

## 2019-05-28 PROCEDURE — 86920 COMPATIBILITY TEST SPIN: CPT

## 2019-05-28 PROCEDURE — 96361 HYDRATE IV INFUSION ADD-ON: CPT

## 2019-05-28 PROCEDURE — 99285 EMERGENCY DEPT VISIT HI MDM: CPT

## 2019-05-28 PROCEDURE — 0DJ08ZZ INSPECTION OF UPPER INTESTINAL TRACT, VIA NATURAL OR ARTIFICIAL OPENING ENDOSCOPIC: ICD-10-PCS | Performed by: INTERNAL MEDICINE

## 2019-05-28 PROCEDURE — 85018 HEMOGLOBIN: CPT | Performed by: INTERNAL MEDICINE

## 2019-05-28 PROCEDURE — 30233N1 TRANSFUSION OF NONAUTOLOGOUS RED BLOOD CELLS INTO PERIPHERAL VEIN, PERCUTANEOUS APPROACH: ICD-10-PCS | Performed by: INTERNAL MEDICINE

## 2019-05-28 PROCEDURE — 85730 THROMBOPLASTIN TIME PARTIAL: CPT | Performed by: EMERGENCY MEDICINE

## 2019-05-28 PROCEDURE — 86900 BLOOD TYPING SEROLOGIC ABO: CPT

## 2019-05-28 PROCEDURE — C9113 INJ PANTOPRAZOLE SODIUM, VIA: HCPCS | Performed by: EMERGENCY MEDICINE

## 2019-05-28 PROCEDURE — 36430 TRANSFUSION BLD/BLD COMPNT: CPT

## 2019-05-28 PROCEDURE — 86901 BLOOD TYPING SEROLOGIC RH(D): CPT | Performed by: EMERGENCY MEDICINE

## 2019-05-28 PROCEDURE — 80053 COMPREHEN METABOLIC PANEL: CPT | Performed by: EMERGENCY MEDICINE

## 2019-05-28 PROCEDURE — 86900 BLOOD TYPING SEROLOGIC ABO: CPT | Performed by: EMERGENCY MEDICINE

## 2019-05-28 PROCEDURE — 86901 BLOOD TYPING SEROLOGIC RH(D): CPT

## 2019-05-28 PROCEDURE — 85610 PROTHROMBIN TIME: CPT | Performed by: EMERGENCY MEDICINE

## 2019-05-28 RX ORDER — PANTOPRAZOLE SODIUM 40 MG/1
40 TABLET, DELAYED RELEASE ORAL
Status: DISCONTINUED | OUTPATIENT
Start: 2019-05-28 | End: 2019-05-29

## 2019-05-28 RX ORDER — SODIUM CHLORIDE 9 MG/ML
INJECTION, SOLUTION INTRAVENOUS ONCE
Status: DISCONTINUED | OUTPATIENT
Start: 2019-05-28 | End: 2019-05-29

## 2019-05-28 RX ORDER — MONTELUKAST SODIUM 10 MG/1
10 TABLET ORAL DAILY
Status: DISCONTINUED | OUTPATIENT
Start: 2019-05-29 | End: 2019-05-29

## 2019-05-28 RX ORDER — ACETAMINOPHEN 325 MG/1
650 TABLET ORAL EVERY 6 HOURS PRN
Status: DISCONTINUED | OUTPATIENT
Start: 2019-05-28 | End: 2019-05-29

## 2019-05-28 RX ORDER — ATORVASTATIN CALCIUM 10 MG/1
10 TABLET, FILM COATED ORAL DAILY
Status: DISCONTINUED | OUTPATIENT
Start: 2019-05-29 | End: 2019-05-29

## 2019-05-28 RX ORDER — SODIUM CHLORIDE 9 MG/ML
INJECTION, SOLUTION INTRAVENOUS CONTINUOUS
Status: DISCONTINUED | OUTPATIENT
Start: 2019-05-28 | End: 2019-05-28

## 2019-05-28 RX ORDER — PANTOPRAZOLE SODIUM 40 MG/1
40 TABLET, DELAYED RELEASE ORAL EVERY 12 HOURS
Status: DISCONTINUED | OUTPATIENT
Start: 2019-05-28 | End: 2019-05-28

## 2019-05-28 RX ORDER — ASPIRIN 81 MG/1
81 TABLET ORAL DAILY
Status: ON HOLD | COMMUNITY
End: 2019-05-29

## 2019-05-28 RX ORDER — ONDANSETRON 2 MG/ML
4 INJECTION INTRAMUSCULAR; INTRAVENOUS EVERY 4 HOURS PRN
Status: DISCONTINUED | OUTPATIENT
Start: 2019-05-28 | End: 2019-05-29

## 2019-05-28 RX ORDER — INSULIN ASPART 100 [IU]/ML
0.15 INJECTION, SOLUTION INTRAVENOUS; SUBCUTANEOUS ONCE
Status: COMPLETED | OUTPATIENT
Start: 2019-05-28 | End: 2019-05-28

## 2019-05-28 RX ORDER — LEVOTHYROXINE SODIUM 0.05 MG/1
50 TABLET ORAL
COMMUNITY
End: 2019-09-05

## 2019-05-28 RX ORDER — LEVOTHYROXINE SODIUM 0.05 MG/1
50 TABLET ORAL
Status: DISCONTINUED | OUTPATIENT
Start: 2019-05-29 | End: 2019-05-29

## 2019-05-28 NOTE — OPERATIVE REPORT
ENDOSCOPY OPERATIVE REPORT    Patient Name:  Jamil Rivas Record #: [id number]  YOB: 1960  Date of Procedure: 5/28/2019    Preoperative Diagnosis:  Hematemesis, acute blood loss anemia    Postoperative Diagnosis:  Non bleeding es as retroverted views of the fundus were obtained. Moderate severity portal htn gastropathy noted. No new/old blood noted anywhere during egd.      A normal pylorus was passed and the duodenal bulb entered, the duodenal bulb and post-bulbar duodenum were un

## 2019-05-28 NOTE — H&P
[facility] Hospitalist History and Physical      Patient presents with:  GI Bleeding (gastrointestinal)  Nausea/Vomiting/Diarrhea (gastrointestinal)  Dizziness (neurologic)       PCP: Nancie Aden MD      History of Present Illness: Patient is a 63 yea Comment:Rash       No current facility-administered medications on file prior to encounter. Current Outpatient Medications on File Prior to Encounter:  aspirin EC 81 MG Oral Tab EC Take 81 mg by mouth daily.  Disp:  Rfl:    Levothyroxine Sodium 50 MCG Ora 102   Temp 100.2 °F (37.9 °C)   Resp 19   Ht 5' 4\" (1.626 m)   Wt 170 lb (77.1 kg)   SpO2 100%   BMI 29.18 kg/m²   General:  Alert, no distress, appears stated age. Chronically ill appearing. Pale.    Head:  Normocephalic, without obvious abnormality, atr Technologist)  Patient has redness and swelling to the right ankle for one day, but did not have an injury. FINDINGS:  No evidence of acute displaced fracture or dislocation. Normal mineralization. Unremarkable soft tissues.   Chronic deformity of the d TECHNIQUE:  Real time, grey scale, and duplex ultrasound was used to evaluate the lower extremity venous system. B-mode two-dimensional images of the vascular structures, Doppler spectral analysis, and color flow. Doppler imaging were performed.   The foll accuchecks    #Asthma  - stable  - cont home inhalers    DVT prophy - SCD  Dispo: inpt care in the ICU. Plan of care d/w pt and  at bedside. Outpatient records or previous hospital records reviewed.    [facility] hospitalist to continue to follow patien

## 2019-05-28 NOTE — ANESTHESIA PREPROCEDURE EVALUATION
PRE-OP EVALUATION    Patient Name: Saira Cedillo    Pre-op Diagnosis: gi bleeding    Procedure(s):  Esophagogastroduodenoscopy to be done at bedside    Surgeon(s) and Role:     * Karla Carney MD - Primary    Pre-op vitals reviewed.   Temp: 100.2 °F MCG/DOSE Inhalation Aerosol Powder, Breath Activated Inhale 1 puff into the lungs 2 (two) times daily. Disp:  Rfl:    atorvastatin 10 MG Oral Tab Take 10 mg by mouth daily. Disp:  Rfl:    Biotin 5 MG Oral Cap Take 1 capsule by mouth daily.  Disp:  Rfl: Date     (L) 05/28/2019    K 4.2 05/28/2019     05/28/2019    CO2 23.0 05/28/2019    BUN 32 (H) 05/28/2019    CREATSERUM 0.88 05/28/2019     (H) 05/28/2019    CA 8.6 05/28/2019     Lab Results   Component Value Date    INR 1.31 (H) 05/28

## 2019-05-28 NOTE — ED PROVIDER NOTES
Patient Seen in: [facility] Emergency Department    History   Patient presents with:  GI Bleeding (gastrointestinal)  Nausea/Vomiting/Diarrhea (gastrointestinal)  Dizziness (neurologic)    Stated Complaint: vomiting blood, dizzy, elevated glucose, s Use      Smoking status: Never Smoker      Smokeless tobacco: Never Used    Alcohol use:  Yes      Alcohol/week: 0.0 oz      Comment: Social    Drug use: No      Review of Systems    Positive for stated complaint: vomiting blood, dizzy, elevated glucose, se Glucose 328 (*)     All other components within normal limits   CBC W/ DIFFERENTIAL - Abnormal; Notable for the following components:    RBC 2.17 (*)     HGB 6.5 (*)     HCT 19.8 (*)     .0 (*)     RDW 20.9 (*)     RDW-SD 70.5 (*)     All other comp the intensivist and the hospitalist.    Admission disposition: 5/28/2019 12:17 PM                 Disposition and Plan     Clinical Impression:  UGI bleed  (primary encounter diagnosis)  Alcoholism ([facility] [address]  Hypotension, unspecified hypotension type  Hemateme

## 2019-05-28 NOTE — ANESTHESIA POSTPROCEDURE EVALUATION
[address] Patient Status:  Inpatient   Age/Gender 63year old female MRN [id number]   [facility] 4SW-A Attending Vic Peck MD   Hosp Day # 0 PCP Nancie Aden MD       Anesthesia Post-op Note

## 2019-05-28 NOTE — CONSULTS
[address] Patient Status:  Inpatient    1960 MRN [id number]   [facility] 4SW-A Attending Carolina Ivey MD   Hosp Day # 0 PCP Krystal Serrano MD     Date of Admission: 2019  Admission Diagn [Sulfametho*    HIVES  Cleocin [Clindamyci*    ITCHING    Comment:Rash     Social History:   reports that she has never smoked. She has never used smokeless tobacco. She reports that she drinks alcohol. She reports that she does not use drugs.       Family **FOLLOWED BY** Octreotide Acetate (sandoSTATIN) 500 mcg in sodium chloride 0.9% 100 mL infusion, 50 mcg/hr, Intravenous, Continuous  •  Pantoprazole Sodium (PROTONIX) 80 mg in sodium chloride 0.9% 100 mL infusion, 8 mg/hr, Intravenous, Continuous  •  0.9% intolerance  Hematologic/lymphatic: denies easy bruising, new blood clots, or lymphedema  Allergic/immunologic: denies hay fever, hives, or new allergies      OBJECTIVE:  /47   Pulse 102   Temp 100.2 °F (37.9 °C)   Resp 19   Ht 5' 4\" (1.626 m)   Wt visualized all relevant chest imaging in PACS. I agree with the radiology interpretation except where noted. ASSESSMENT/PLAN:  1.  Acute Upper GI bleed with acute blood loss anemia   -IVF  -s/p EGD with non-bleeding esophageal varices noted as well a

## 2019-05-28 NOTE — CONSULTS
[facility]  Report of Consultation    Patrick Stevens Patient Status:  Emergency    1960 MRN [id number]   Location [address] [facility] Attending Andre Ruiz MD   Hosp Day # 0 PCP Kathie Flores MD     Reason for Co allergy to medication, and perforation among others. The recommendation for conscious sedation or anesthesia support for sedation was also discussed.   The patient was informed to avoid all aspirin products, plavix and coumadin for 5 days prior to the proc varices, portal hypertensive gastropathy, SB Bx taken   • LAPAROSCOPY PROCEDURE UNLISTED      ovarian cyst       Family History   Problem Relation Age of Onset   • Diabetes Father    • Diabetes Mother    • Heart Disorder Mother         cabg   • Heart Disor mg total) by mouth 3 (three) times daily. Disp: 270 tablet Rfl: 3   Montelukast Sodium 10 MG Oral Tab Take 10 mg by mouth daily.    Disp:  Rfl:    fluticasone-salmeterol 500-50 MCG/DOSE Inhalation Aerosol Powder, Breath Activated Inhale 1 puff into the lung masses, HSM or tenderness, soft, nondistended, no G/R/R , no obvious ascites  RECTAL: done in presence of one of the ER patient tech's (Sussy), dark green/black stool. No overt blood  EXTREMITIES: mild erythema rt lower ext. No pain on palpation.  No edema and/or treatment. --the spouse and patient demonstrated understanding of the results and recommendations and options.   All of their questions and concerns were addressed and were agreeable to the plan as listed        Norbert Busch  5/28/2019  12:33

## 2019-05-29 ENCOUNTER — APPOINTMENT (OUTPATIENT)
Dept: ULTRASOUND IMAGING | Facility: HOSPITAL | Age: 59
DRG: 378 | End: 2019-05-29
Attending: INTERNAL MEDICINE
Payer: COMMERCIAL

## 2019-05-29 VITALS
OXYGEN SATURATION: 98 % | WEIGHT: 166 LBS | HEART RATE: 79 BPM | SYSTOLIC BLOOD PRESSURE: 118 MMHG | DIASTOLIC BLOOD PRESSURE: 63 MMHG | TEMPERATURE: 98 F | HEIGHT: 64 IN | BODY MASS INDEX: 28.34 KG/M2 | RESPIRATION RATE: 16 BRPM

## 2019-05-29 PROCEDURE — 85025 COMPLETE CBC W/AUTO DIFF WBC: CPT | Performed by: INTERNAL MEDICINE

## 2019-05-29 PROCEDURE — 84100 ASSAY OF PHOSPHORUS: CPT | Performed by: NURSE PRACTITIONER

## 2019-05-29 PROCEDURE — 76700 US EXAM ABDOM COMPLETE: CPT | Performed by: INTERNAL MEDICINE

## 2019-05-29 PROCEDURE — 85018 HEMOGLOBIN: CPT | Performed by: INTERNAL MEDICINE

## 2019-05-29 PROCEDURE — 85610 PROTHROMBIN TIME: CPT | Performed by: NURSE PRACTITIONER

## 2019-05-29 PROCEDURE — 83735 ASSAY OF MAGNESIUM: CPT | Performed by: NURSE PRACTITIONER

## 2019-05-29 PROCEDURE — 85730 THROMBOPLASTIN TIME PARTIAL: CPT | Performed by: NURSE PRACTITIONER

## 2019-05-29 PROCEDURE — 80053 COMPREHEN METABOLIC PANEL: CPT | Performed by: INTERNAL MEDICINE

## 2019-05-29 PROCEDURE — 97161 PT EVAL LOW COMPLEX 20 MIN: CPT

## 2019-05-29 PROCEDURE — 94640 AIRWAY INHALATION TREATMENT: CPT

## 2019-05-29 PROCEDURE — 82962 GLUCOSE BLOOD TEST: CPT

## 2019-05-29 RX ORDER — CEFADROXIL 500 MG/1
500 CAPSULE ORAL 2 TIMES DAILY
Qty: 6 CAPSULE | Refills: 0 | Status: SHIPPED | OUTPATIENT
Start: 2019-05-29 | End: 2019-06-01

## 2019-05-29 RX ORDER — MAGNESIUM OXIDE 400 MG (241.3 MG MAGNESIUM) TABLET
400 TABLET ONCE
Status: COMPLETED | OUTPATIENT
Start: 2019-05-29 | End: 2019-05-29

## 2019-05-29 RX ORDER — PANTOPRAZOLE SODIUM 40 MG/1
40 TABLET, DELAYED RELEASE ORAL
Qty: 30 TABLET | Refills: 0 | Status: SHIPPED | OUTPATIENT
Start: 2019-05-29 | End: 2019-06-28

## 2019-05-29 NOTE — PLAN OF CARE
Problem: HEMATOLOGIC - ADULT  Goal: Maintains hematologic stability  Description  INTERVENTIONS  - Assess for signs and symptoms of bleeding or hemorrhage  - Monitor labs and vital signs for trends  - Administer supportive blood products/factors, fluids

## 2019-05-29 NOTE — PLAN OF CARE
Assumed pt care this morning. Aa/ox4. Breathing unlabored. Denies pain. Tolerating clear liquids. Up ad bonifacio with steady gait. Per Dr. Hernan Aguayo advance diet as tolerated and may d/c home later today if tolerates. Will transfer out of ICU for now.

## 2019-05-29 NOTE — PROGRESS NOTES
[facility]  Progress Note    Breanne Negrete Patient Status:  Inpatient    1960 MRN [id number]   [facility] 4SW-A Attending Xavier Stanford MD   Hosp Day # 1 PCP Kirill Parra MD     Subjective:  Breanne Negrete 05/28/19  1045  05/29/19  0303   RBC 2.17*  --  2.39*   HGB 6.5*   < > 7.2*   HCT 19.8*  --  21.4*   MCV 91.2  --  89.5   MCH 30.0  --  30.1   MCHC 32.8  --  33.6   RDW 20.9*  --  20.8*   NEPRELIM 4.43  --  2.18   WBC 7.2  --  4.5   .0*  --  74.0* Acute gastritis with hemorrhage, unspecified gastritis type          1 ugi bleeding  2 non bleeding esoph varices  3 gastric erosion  4 portal htn gastropathy  5 hematemesis  6 acute blood loss anemia and chronic blood loss anemia    Hg stable.  Bun improve

## 2019-05-29 NOTE — CM/SW NOTE
05/29/19 1400   CM/SW Screening   Referral Source Social Work (self-referral);    [address] staff; Chart review;Nursing rounds   Patient's Mental Status Alert;Oriented   Patient lives with Spouse     Patient was screened during ro

## 2019-05-29 NOTE — DISCHARGE SUMMARY
General Medicine Discharge Summary     Patient ID:  Paola Benitez  63year old  5/23/1960    Admit date: 5/28/2019    Discharge date and time: 5/29/2019    Attending Physician: Rawland Cabot THERAPY    Operative Procedures: Procedure(s) (LRB):  ESOPHAGOGASTRODUODENOSCOPY (EGD) (N/A)       Patient instructions:      Current Discharge Medication List    START taking these medications    Pantoprazole Sodium 40 MG Oral Tab EC  Take 1 tablet (40 mg

## 2019-05-29 NOTE — PHYSICAL THERAPY NOTE
PHYSICAL THERAPY QUICK EVALUATION - INPATIENT    Room Number: 454/454-A  Evaluation Date: 5/29/2019  Presenting Problem: UGI bleed, weakness  Physician Order: PT Eval and Treat     History Related to Current Admission: Pt admitted through ER w/ c/o of vo w/ adl's, gait w/o device, drives and works as a teacher.     SUBJECTIVE  \"I'm feeling good\"    OBJECTIVE     Fall Risk: Standard fall risk    WEIGHT BEARING RESTRICTION  Weight Bearing Restriction: None                PAIN ASSESSMENT  Ratin         R normal gait pattern, w/o lob - independent. Pt completed flight of stairs using step over step pattern and use of railing and modified independence. Pt completed Modified Holcomb balance exam - see below for details. Modified Holcomb Balance Test    1.  Sitt to ambulate on level surfaces Safely and independently

## 2019-05-29 NOTE — BH PROGRESS NOTE
Patient seen for her etoh history. She said, she drinks up to a 1/2 pint of vodka daily. She said, she started out drinking due to not being able to sleep. She admits this turned into enjoying the feeling of relaxation after a drink.   The pt talked abou

## 2019-05-29 NOTE — PROGRESS NOTES
Pt arrived from ICU, pt is A/O x4. RA lungs clear. Denies pain. Up self and is steady, right lower extremity is swollen, red.  Will continue to monitor

## 2019-05-29 NOTE — PROGRESS NOTES
Eunice Perera Patient Status:  Inpatient    1960 MRN [id number]   [facility] 4SW-A Attending Shana Lesches, MD   Hosp Day # 1 PCP Ileana Gustafson MD     Critical Care Progress Note      Assessment/Plan:  1.  Acute Up clear   Chest wall: No tenderness or deformity. Heart: Regular rate and rhythm, normal S1S2, no  murmur. Abdomen: soft, non-tender, non-distended, positive BS. Extremity: No clubbing or cyanosis no edema. Skin: No rashes or lesions.     Recent Labs

## 2019-05-29 NOTE — PLAN OF CARE
Assumed care of patient for night shift. Patient A/O x4. Able to walk to bathroom, stable gait. No s/s of bleeding. Monitoring Hgb blood draws. Room air. NSR on monitor. BP slightly decreased, patient states this is her baseline.  CIWAs continue to be negat

## 2019-06-10 PROBLEM — D61.818 PANCYTOPENIA (HCC): Status: RESOLVED | Noted: 2018-09-06 | Resolved: 2019-06-10

## 2019-06-10 PROBLEM — E08.65 DIABETES MELLITUS DUE TO UNDERLYING CONDITION, UNCONTROLLED, WITH HYPERGLYCEMIA, WITHOUT LONG-TERM CURRENT USE OF INSULIN (HCC): Status: RESOLVED | Noted: 2017-07-04 | Resolved: 2019-06-10

## 2019-06-28 PROBLEM — D50.8 IRON DEFICIENCY ANEMIA REFRACTORY TO IRON THERAPY: Status: ACTIVE | Noted: 2019-06-28

## 2019-07-08 ENCOUNTER — HOSPITAL ENCOUNTER (INPATIENT)
Facility: HOSPITAL | Age: 59
LOS: 2 days | Discharge: HOME | DRG: 442 | End: 2019-07-10
Attending: EMERGENCY MEDICINE | Admitting: INTERNAL MEDICINE
Payer: COMMERCIAL

## 2019-07-08 ENCOUNTER — ANESTHESIA EVENT (OUTPATIENT)
Dept: ENDOSCOPY | Facility: HOSPITAL | Age: 59
End: 2019-07-08

## 2019-07-08 DIAGNOSIS — E78.2 MIXED HYPERLIPIDEMIA: ICD-10-CM

## 2019-07-08 DIAGNOSIS — E11.65 TYPE 2 DIABETES MELLITUS WITH HYPERGLYCEMIA, WITHOUT LONG-TERM CURRENT USE OF INSULIN (HCC): ICD-10-CM

## 2019-07-08 DIAGNOSIS — I85.00 ESOPHAGEAL VARICES DETERMINED BY ENDOSCOPY (HCC): ICD-10-CM

## 2019-07-08 DIAGNOSIS — K92.2 UPPER GI BLEEDING: Primary | ICD-10-CM

## 2019-07-08 DIAGNOSIS — D62 ANEMIA DUE TO ACUTE BLOOD LOSS: ICD-10-CM

## 2019-07-08 DIAGNOSIS — E11.65 UNCONTROLLED TYPE 2 DIABETES MELLITUS WITH HYPERGLYCEMIA, WITHOUT LONG-TERM CURRENT USE OF INSULIN (HCC): ICD-10-CM

## 2019-07-08 PROBLEM — E87.2 METABOLIC ACIDOSIS: Status: ACTIVE | Noted: 2019-07-08

## 2019-07-08 PROBLEM — E87.20 METABOLIC ACIDOSIS: Status: ACTIVE | Noted: 2019-07-08

## 2019-07-08 LAB
ALBUMIN SERPL-MCNC: 2.7 G/DL (ref 3.4–5)
ALBUMIN/GLOB SERPL: 0.7 {RATIO} (ref 1–2)
ALP LIVER SERPL-CCNC: 135 U/L (ref 46–118)
ALT SERPL-CCNC: 30 U/L (ref 13–56)
ANION GAP SERPL CALC-SCNC: 9 MMOL/L (ref 0–18)
ANTIBODY SCREEN: NEGATIVE
AST SERPL-CCNC: 47 U/L (ref 15–37)
BASOPHILS # BLD AUTO: 0.02 X10(3) UL (ref 0–0.2)
BASOPHILS NFR BLD AUTO: 0.4 %
BILIRUB SERPL-MCNC: 1.7 MG/DL (ref 0.1–2)
BUN BLD-MCNC: 19 MG/DL (ref 7–18)
BUN/CREAT SERPL: 23.5 (ref 10–20)
CALCIUM BLD-MCNC: 9.6 MG/DL (ref 8.5–10.1)
CHLORIDE SERPL-SCNC: 105 MMOL/L (ref 98–112)
CHOLEST SMN-MCNC: 111 MG/DL (ref ?–200)
CO2 SERPL-SCNC: 21 MMOL/L (ref 21–32)
CREAT BLD-MCNC: 0.81 MG/DL (ref 0.55–1.02)
DEPRECATED RDW RBC AUTO: 63.4 FL (ref 35.1–46.3)
DEPRECATED RDW RBC AUTO: 68.1 FL (ref 35.1–46.3)
EOSINOPHIL # BLD AUTO: 0.05 X10(3) UL (ref 0–0.7)
EOSINOPHIL NFR BLD AUTO: 1 %
ERYTHROCYTE [DISTWIDTH] IN BLOOD BY AUTOMATED COUNT: 22.9 % (ref 11–15)
ERYTHROCYTE [DISTWIDTH] IN BLOOD BY AUTOMATED COUNT: 24.1 % (ref 11–15)
EST. AVERAGE GLUCOSE BLD GHB EST-MCNC: 232 MG/DL (ref 68–126)
ETHANOL SERPL-MCNC: <3 MG/DL (ref ?–3)
GLOBULIN PLAS-MCNC: 3.9 G/DL (ref 2.8–4.4)
GLUCOSE BLD-MCNC: 222 MG/DL (ref 70–99)
GLUCOSE BLD-MCNC: 230 MG/DL (ref 70–99)
GLUCOSE BLD-MCNC: 324 MG/DL (ref 70–99)
GLUCOSE BLD-MCNC: 387 MG/DL (ref 70–99)
HBA1C MFR BLD HPLC: 9.7 % (ref ?–5.7)
HCT VFR BLD AUTO: 23.2 % (ref 35–48)
HCT VFR BLD AUTO: 23.8 % (ref 35–48)
HDLC SERPL-MCNC: 28 MG/DL (ref 40–59)
HGB BLD-MCNC: 7.3 G/DL (ref 12–16)
HGB BLD-MCNC: 7.7 G/DL (ref 12–16)
IMM GRANULOCYTES # BLD AUTO: 0.02 X10(3) UL (ref 0–1)
IMM GRANULOCYTES NFR BLD: 0.4 %
INR BLD: 1.31 (ref 0.9–1.1)
LDLC SERPL CALC-MCNC: 55 MG/DL (ref ?–100)
LYMPHOCYTES # BLD AUTO: 0.87 X10(3) UL (ref 1–4)
LYMPHOCYTES NFR BLD AUTO: 17.9 %
M PROTEIN MFR SERPL ELPH: 6.6 G/DL (ref 6.4–8.2)
MCH RBC QN AUTO: 26.7 PG (ref 26–34)
MCH RBC QN AUTO: 27.8 PG (ref 26–34)
MCHC RBC AUTO-ENTMCNC: 31.5 G/DL (ref 31–37)
MCHC RBC AUTO-ENTMCNC: 32.4 G/DL (ref 31–37)
MCV RBC AUTO: 85 FL (ref 80–100)
MCV RBC AUTO: 85.9 FL (ref 80–100)
MONOCYTES # BLD AUTO: 0.71 X10(3) UL (ref 0.1–1)
MONOCYTES NFR BLD AUTO: 14.6 %
NEUTROPHILS # BLD AUTO: 3.2 X10 (3) UL (ref 1.5–7.7)
NEUTROPHILS # BLD AUTO: 3.2 X10(3) UL (ref 1.5–7.7)
NEUTROPHILS NFR BLD AUTO: 65.7 %
NONHDLC SERPL-MCNC: 83 MG/DL (ref ?–130)
OSMOLALITY SERPL CALC.SUM OF ELEC: 298 MOSM/KG (ref 275–295)
PLATELET # BLD AUTO: 115 10(3)UL (ref 150–450)
PLATELET # BLD AUTO: 81 10(3)UL (ref 150–450)
PLATELET MORPHOLOGY: NORMAL
POTASSIUM SERPL-SCNC: 4.3 MMOL/L (ref 3.5–5.1)
PSA SERPL DL<=0.01 NG/ML-MCNC: 16.9 SECONDS (ref 12.5–14.7)
RBC # BLD AUTO: 2.73 X10(6)UL (ref 3.8–5.3)
RBC # BLD AUTO: 2.77 X10(6)UL (ref 3.8–5.3)
RH BLOOD TYPE: POSITIVE
SODIUM SERPL-SCNC: 135 MMOL/L (ref 136–145)
TRIGL SERPL-MCNC: 141 MG/DL (ref 30–149)
TSI SER-ACNC: 1.81 MIU/ML (ref 0.36–3.74)
VLDLC SERPL CALC-MCNC: 28 MG/DL (ref 0–30)
WBC # BLD AUTO: 4.5 X10(3) UL (ref 4–11)
WBC # BLD AUTO: 4.9 X10(3) UL (ref 4–11)

## 2019-07-08 PROCEDURE — 96375 TX/PRO/DX INJ NEW DRUG ADDON: CPT

## 2019-07-08 PROCEDURE — 83036 HEMOGLOBIN GLYCOSYLATED A1C: CPT | Performed by: INTERNAL MEDICINE

## 2019-07-08 PROCEDURE — 84443 ASSAY THYROID STIM HORMONE: CPT | Performed by: INTERNAL MEDICINE

## 2019-07-08 PROCEDURE — 86901 BLOOD TYPING SEROLOGIC RH(D): CPT | Performed by: EMERGENCY MEDICINE

## 2019-07-08 PROCEDURE — 85025 COMPLETE CBC W/AUTO DIFF WBC: CPT

## 2019-07-08 PROCEDURE — 36430 TRANSFUSION BLD/BLD COMPNT: CPT

## 2019-07-08 PROCEDURE — 87081 CULTURE SCREEN ONLY: CPT | Performed by: INTERNAL MEDICINE

## 2019-07-08 PROCEDURE — 96365 THER/PROPH/DIAG IV INF INIT: CPT

## 2019-07-08 PROCEDURE — 86900 BLOOD TYPING SEROLOGIC ABO: CPT | Performed by: EMERGENCY MEDICINE

## 2019-07-08 PROCEDURE — C9113 INJ PANTOPRAZOLE SODIUM, VIA: HCPCS | Performed by: EMERGENCY MEDICINE

## 2019-07-08 PROCEDURE — 96361 HYDRATE IV INFUSION ADD-ON: CPT

## 2019-07-08 PROCEDURE — 99285 EMERGENCY DEPT VISIT HI MDM: CPT

## 2019-07-08 PROCEDURE — 80053 COMPREHEN METABOLIC PANEL: CPT | Performed by: EMERGENCY MEDICINE

## 2019-07-08 PROCEDURE — 86920 COMPATIBILITY TEST SPIN: CPT

## 2019-07-08 PROCEDURE — 85025 COMPLETE CBC W/AUTO DIFF WBC: CPT | Performed by: EMERGENCY MEDICINE

## 2019-07-08 PROCEDURE — 86900 BLOOD TYPING SEROLOGIC ABO: CPT

## 2019-07-08 PROCEDURE — 86850 RBC ANTIBODY SCREEN: CPT | Performed by: EMERGENCY MEDICINE

## 2019-07-08 PROCEDURE — 85018 HEMOGLOBIN: CPT | Performed by: INTERNAL MEDICINE

## 2019-07-08 PROCEDURE — 82962 GLUCOSE BLOOD TEST: CPT

## 2019-07-08 PROCEDURE — 80053 COMPREHEN METABOLIC PANEL: CPT

## 2019-07-08 PROCEDURE — 86901 BLOOD TYPING SEROLOGIC RH(D): CPT

## 2019-07-08 PROCEDURE — 85027 COMPLETE CBC AUTOMATED: CPT | Performed by: INTERNAL MEDICINE

## 2019-07-08 PROCEDURE — 96368 THER/DIAG CONCURRENT INF: CPT

## 2019-07-08 PROCEDURE — 80320 DRUG SCREEN QUANTALCOHOLS: CPT | Performed by: INTERNAL MEDICINE

## 2019-07-08 PROCEDURE — 30233N1 TRANSFUSION OF NONAUTOLOGOUS RED BLOOD CELLS INTO PERIPHERAL VEIN, PERCUTANEOUS APPROACH: ICD-10-PCS | Performed by: INTERNAL MEDICINE

## 2019-07-08 PROCEDURE — 80061 LIPID PANEL: CPT | Performed by: INTERNAL MEDICINE

## 2019-07-08 PROCEDURE — 85610 PROTHROMBIN TIME: CPT | Performed by: INTERNAL MEDICINE

## 2019-07-08 PROCEDURE — 86850 RBC ANTIBODY SCREEN: CPT

## 2019-07-08 RX ORDER — ONDANSETRON 2 MG/ML
4 INJECTION INTRAMUSCULAR; INTRAVENOUS EVERY 4 HOURS PRN
Status: DISCONTINUED | OUTPATIENT
Start: 2019-07-08 | End: 2019-07-10

## 2019-07-08 RX ORDER — ALBUTEROL SULFATE 90 UG/1
1 AEROSOL, METERED RESPIRATORY (INHALATION) EVERY 6 HOURS PRN
COMMUNITY

## 2019-07-08 RX ORDER — SODIUM CHLORIDE 9 MG/ML
INJECTION, SOLUTION INTRAVENOUS CONTINUOUS
Status: DISCONTINUED | OUTPATIENT
Start: 2019-07-08 | End: 2019-07-08

## 2019-07-08 RX ORDER — ONDANSETRON 2 MG/ML
4 INJECTION INTRAMUSCULAR; INTRAVENOUS ONCE
Status: COMPLETED | OUTPATIENT
Start: 2019-07-08 | End: 2019-07-08

## 2019-07-08 RX ORDER — ALBUTEROL SULFATE 90 UG/1
1 AEROSOL, METERED RESPIRATORY (INHALATION) EVERY 6 HOURS PRN
Status: DISCONTINUED | OUTPATIENT
Start: 2019-07-08 | End: 2019-07-10

## 2019-07-08 RX ORDER — DEXTROSE MONOHYDRATE 25 G/50ML
50 INJECTION, SOLUTION INTRAVENOUS
Status: DISCONTINUED | OUTPATIENT
Start: 2019-07-08 | End: 2019-07-10

## 2019-07-08 RX ORDER — DEXTROSE AND SODIUM CHLORIDE 5; .45 G/100ML; G/100ML
INJECTION, SOLUTION INTRAVENOUS CONTINUOUS
Status: DISCONTINUED | OUTPATIENT
Start: 2019-07-08 | End: 2019-07-10

## 2019-07-08 RX ORDER — ASPIRIN 81 MG/1
81 TABLET ORAL DAILY
Status: ON HOLD | COMMUNITY
End: 2019-07-10

## 2019-07-08 NOTE — CONSULTS
Pulmonary H&P/Consult       NAME: Simona 46: C2/C2 - MRN: [id number] - Age: 63year old - :  1960    Date of Admission: 2019 10:38 AM  Admission Diagnosis: No admission diagnoses are documented for this encounter.   Reason for consu status:       Spouse name: Alvaro Galloway      Number of children: 2      Years of education: Not on file      Highest education level: Not on file    Occupational History      Occupation: Teacher, [place], special ed        Employer: [employer] Family History:  Family History   Problem Relation Age of Onset   • Diabetes Father    • Diabetes Mother    • Heart Disorder Mother         cabg   • Heart Disorder Maternal Grandmother    • Cancer Maternal Grandmother         small intestine cancer oz (78.2 kg)        Intake/Output Summary (Last 24 hours) at 7/8/2019 1306  Last data filed at 7/8/2019 1212  Gross per 24 hour   Intake 1000 ml   Output —   Net 1000 ml       /60   Pulse 90   Temp 97.4 °F (36.3 °C) (Temporal)   Resp 14   Ht 5' 2\" ( BUN 19*   CA 9.6       Recent Labs     07/08/19  1048   ALT 30   AST 47*   ALB 2.7*       Invalid input(s): ARTERIALPH, ARTERIALPO2, ARTERIALPCO2, ARTERIALHCO3    No results for input(s): BNP in the last 72 hours.     Invalid input(s): TROPI    INR   Date Final         ASSESSMENT/PLAN:    1. Acute Blood Loss Anemia  -due to GI bleed  -monitor counts and transfuse for Hgb <8 given active bleed  2.  GI bleed  -suspect recurrence of previous upper GI bleed due to esophageal varices and gastric erosion  -GI to s

## 2019-07-08 NOTE — ANESTHESIA PREPROCEDURE EVALUATION
PRE-OP EVALUATION    Patient Name: Mariano Coats    Pre-op Diagnosis: gi bleeding    Procedure(s):  Esophagogastroduodenoscopy to be done at bedside    Surgeon(s) and Role:     * Dania Diaz MD - Primary    Pre-op vitals reviewed.   Temp: 99.5 °F taking for the 7/9/19 encounter (Anesthesia Event) with Verner Crape, MD.    Allergies: Bactrim [Sulfamethoxazole W/Trimethoprim]; Cleocin [Clindamycin]      Anesthesia Evaluation    Patient summary reviewed.     Anesthetic Complications  (-) history of a 07/08/2019    CREATSERUM 0.81 07/08/2019     (H) 07/08/2019    CA 9.6 07/08/2019            Airway      Mallampati: II  Mouth opening: 3 FB  TM distance: 4 - 6 cm  Neck ROM: full Cardiovascular      Rhythm: regular  Rate: normal     Dental    No not

## 2019-07-08 NOTE — CONSULTS
[facility]    Report of Consultation    Breanne Negrete Patient Status:  Inpatient    1960 MRN [id number]   [facility] 4SW-A Attending Xavier Stanford MD   Hosp Day # 0 PCP Kirill Parra MD     Date of Admission Lipids Sister    • Diabetes Brother         diet controlled diabetes   • Breast Cancer Cousin 48        dx age 48   • Breast Cancer Paternal Aunt 54        dx age 54   • Heart Disease Paternal Grandfather    • Hypertension Paternal Grandfather    • Hyperte belching, difficulty swallowing, irregular bowel habits, painful swallowing, diarrhea, abdominal pain, constipation, nausea, incontinence of stool, vomiting, black stools, get full quickly at meals, blood in stools, abdominal distention, jaundice, flatulen latex/rubber allergy, anaphylactic or other reaction to anesthesia, food allergy. Eyes: Denies blurred/double vision, eye disease, glasses or contacts, glaucoma.   ENT: Denies nose or gums bleeding, mouth sores, bad breath or bad taste in mouth, hearing l gastritis with hemorrhage, unspecified gastritis type     Iron deficiency anemia refractory to iron therapy     Metabolic acidosis     Upper GI bleeding     Type 2 diabetes mellitus with hyperglycemia, without long-term current use of insulin ([facility] [address]     Eso

## 2019-07-08 NOTE — H&P
[facility] Hospitalist History and Physical      Patient presents with:  GI Bleeding (gastrointestinal)       PCP: Narcisa Christianson MD      History of Present Illness: Patient is a 63year old female with PMH sig for EtOH abuse, chronic anemia, asthma, and Grandmother    • Cancer Maternal Grandmother         small intestine cancer   • Lipids Sister    • Diabetes Brother         diet controlled diabetes   • Breast Cancer Cousin 48        dx age 48   • Breast Cancer Paternal Aunt 54        dx age 54   • Heart ALKPHO 135 07/08/2019    BILT 1.7 07/08/2019    TP 6.6 07/08/2019    AST 47 07/08/2019    ALT 30 07/08/2019       CXR: image personally reviewed. Radiology: No results found.      Assessment/Plan:       63 yr old female with PMH sig for EtOH abuse, chr

## 2019-07-08 NOTE — CONSULTS
ENDOCRINOLOGY CONSULTATION    Attending physician:  Marielena Abrams MD  Consulting physican:  Tawny Zavala MD    Admission Date:  7/8/2019  Consultation Date:  7/8/2019      Reason for consultation: Mgmt of Type 2 DM    Chief Complaint:  Admi Asthma     managed by her allergist   • Asthma    • Autoimmune disease NEC     scleroderma   • Diabetes ([facility] [address] 06/2017   • Elevated lipids 06/2017   • Elevated liver enzymes 2014   • Encounter for insertion of mirena IUD 6/2012    per pt   • Esophageal vari mL 1,000 mL Intravenous Once   [COMPLETED] ondansetron HCl (ZOFRAN) injection 4 mg 4 mg Intravenous Once   sodium chloride 0.9% IV bolus 1,000 mL 1,000 mL Intravenous Once   Pantoprazole Sodium (PROTONIX) 80 mg in sodium chloride 0.9% 100 mL infusion 8 mg/ use: Not Currently        Alcohol/week: 0.0 oz      Drug use: No      Sexual activity: Yes        Partners: Male        Birth control/protection: IUD    Other Topics      Concerns:        Self-Exams: No        Family History   Problem Relation Age of Onset Units 7/8/2019   Glucose      70 - 99 mg/dL 387 (H)   Sodium      136 - 145 mmol/L 135 (L)   Potassium      3.5 - 5.1 mmol/L 4.3   Chloride      98 - 112 mmol/L 105   Carbon Dioxide, Total      21.0 - 32.0 mmol/L 21.0   ANION GAP      0 - 18 mmol/L 9   BUN outpatient  · Check lipids      4. Hypothyroidism: TSH normal in 2018    · Continue levothyroxine 50 mcg daily  · Check TSH with reflex T4      5.  Hematemesis with ETOH cirrhosis and varices    · Monitor CBCs  · Tranfuse PRBCs  · EGD tomorrow  · GI followi

## 2019-07-08 NOTE — ED NOTES
Nurse to Nurse report called to [facility]. Pt VSS in NAD. Family at bedside. Pt to floor on monitor accompanied by RN.

## 2019-07-08 NOTE — H&P (VIEW-ONLY)
[facility]    Report of Consultation    Karey Bade Patient Status:  Inpatient    1960 MRN [id number]   [facility] 4SW-A Attending Jitendra Gong MD   Hosp Day # 0 PCP Marcella Contreras MD     Date of Admission Lipids Sister    • Diabetes Brother         diet controlled diabetes   • Breast Cancer Cousin 48        dx age 48   • Breast Cancer Paternal Aunt 54        dx age 54   • Heart Disease Paternal Grandfather    • Hypertension Paternal Grandfather    • Hyperte belching, difficulty swallowing, irregular bowel habits, painful swallowing, diarrhea, abdominal pain, constipation, nausea, incontinence of stool, vomiting, black stools, get full quickly at meals, blood in stools, abdominal distention, jaundice, flatulen latex/rubber allergy, anaphylactic or other reaction to anesthesia, food allergy. Eyes: Denies blurred/double vision, eye disease, glasses or contacts, glaucoma.   ENT: Denies nose or gums bleeding, mouth sores, bad breath or bad taste in mouth, hearing l gastritis with hemorrhage, unspecified gastritis type     Iron deficiency anemia refractory to iron therapy     Metabolic acidosis     Upper GI bleeding     Type 2 diabetes mellitus with hyperglycemia, without long-term current use of insulin ([facility] [address]     Eso

## 2019-07-09 ENCOUNTER — ANESTHESIA (OUTPATIENT)
Dept: ENDOSCOPY | Facility: HOSPITAL | Age: 59
End: 2019-07-09

## 2019-07-09 LAB
ANION GAP SERPL CALC-SCNC: 6 MMOL/L (ref 0–18)
APTT PPP: 28.9 SECONDS (ref 25.4–36.1)
BUN BLD-MCNC: 16 MG/DL (ref 7–18)
BUN/CREAT SERPL: 21.6 (ref 10–20)
CALCIUM BLD-MCNC: 8.9 MG/DL (ref 8.5–10.1)
CHLORIDE SERPL-SCNC: 110 MMOL/L (ref 98–112)
CO2 SERPL-SCNC: 23 MMOL/L (ref 21–32)
CREAT BLD-MCNC: 0.74 MG/DL (ref 0.55–1.02)
DEPRECATED RDW RBC AUTO: 65.7 FL (ref 35.1–46.3)
ERYTHROCYTE [DISTWIDTH] IN BLOOD BY AUTOMATED COUNT: 24.1 % (ref 11–15)
GLUCOSE BLD-MCNC: 108 MG/DL (ref 70–99)
GLUCOSE BLD-MCNC: 110 MG/DL (ref 70–99)
GLUCOSE BLD-MCNC: 136 MG/DL (ref 70–99)
GLUCOSE BLD-MCNC: 171 MG/DL (ref 70–99)
GLUCOSE BLD-MCNC: 270 MG/DL (ref 70–99)
GLUCOSE BLD-MCNC: 314 MG/DL (ref 70–99)
HAV IGM SER QL: 2 MG/DL (ref 1.6–2.6)
HCT VFR BLD AUTO: 23.1 % (ref 35–48)
HGB BLD-MCNC: 7.4 G/DL (ref 12–16)
HGB BLD-MCNC: 8.8 G/DL (ref 12–16)
INR BLD: 1.25 (ref 0.9–1.1)
MCH RBC QN AUTO: 27.7 PG (ref 26–34)
MCHC RBC AUTO-ENTMCNC: 32 G/DL (ref 31–37)
MCV RBC AUTO: 86.5 FL (ref 80–100)
OSMOLALITY SERPL CALC.SUM OF ELEC: 290 MOSM/KG (ref 275–295)
PHOSPHATE SERPL-MCNC: 4 MG/DL (ref 2.5–4.9)
PLATELET # BLD AUTO: 81 10(3)UL (ref 150–450)
POTASSIUM SERPL-SCNC: 3.6 MMOL/L (ref 3.5–5.1)
PSA SERPL DL<=0.01 NG/ML-MCNC: 16.3 SECONDS (ref 12.5–14.7)
RBC # BLD AUTO: 2.67 X10(6)UL (ref 3.8–5.3)
SODIUM SERPL-SCNC: 139 MMOL/L (ref 136–145)
WBC # BLD AUTO: 4.5 X10(3) UL (ref 4–11)

## 2019-07-09 PROCEDURE — C9113 INJ PANTOPRAZOLE SODIUM, VIA: HCPCS | Performed by: INTERNAL MEDICINE

## 2019-07-09 PROCEDURE — 85018 HEMOGLOBIN: CPT | Performed by: INTERNAL MEDICINE

## 2019-07-09 PROCEDURE — 83735 ASSAY OF MAGNESIUM: CPT | Performed by: NURSE PRACTITIONER

## 2019-07-09 PROCEDURE — 82962 GLUCOSE BLOOD TEST: CPT

## 2019-07-09 PROCEDURE — 80048 BASIC METABOLIC PNL TOTAL CA: CPT | Performed by: INTERNAL MEDICINE

## 2019-07-09 PROCEDURE — 84100 ASSAY OF PHOSPHORUS: CPT | Performed by: NURSE PRACTITIONER

## 2019-07-09 PROCEDURE — 85027 COMPLETE CBC AUTOMATED: CPT | Performed by: INTERNAL MEDICINE

## 2019-07-09 PROCEDURE — 85730 THROMBOPLASTIN TIME PARTIAL: CPT | Performed by: NURSE PRACTITIONER

## 2019-07-09 PROCEDURE — 0DJ08ZZ INSPECTION OF UPPER INTESTINAL TRACT, VIA NATURAL OR ARTIFICIAL OPENING ENDOSCOPIC: ICD-10-PCS | Performed by: INTERNAL MEDICINE

## 2019-07-09 PROCEDURE — 85610 PROTHROMBIN TIME: CPT | Performed by: NURSE PRACTITIONER

## 2019-07-09 PROCEDURE — 36430 TRANSFUSION BLD/BLD COMPNT: CPT

## 2019-07-09 RX ORDER — PROPRANOLOL HYDROCHLORIDE 10 MG/1
10 TABLET ORAL 3 TIMES DAILY
Status: DISCONTINUED | OUTPATIENT
Start: 2019-07-09 | End: 2019-07-10

## 2019-07-09 RX ORDER — SODIUM CHLORIDE, SODIUM LACTATE, POTASSIUM CHLORIDE, CALCIUM CHLORIDE 600; 310; 30; 20 MG/100ML; MG/100ML; MG/100ML; MG/100ML
INJECTION, SOLUTION INTRAVENOUS CONTINUOUS
Status: CANCELLED | OUTPATIENT
Start: 2019-07-09

## 2019-07-09 RX ORDER — PANTOPRAZOLE SODIUM 40 MG/1
40 TABLET, DELAYED RELEASE ORAL
Status: DISCONTINUED | OUTPATIENT
Start: 2019-07-09 | End: 2019-07-10

## 2019-07-09 RX ORDER — SODIUM CHLORIDE 9 MG/ML
INJECTION, SOLUTION INTRAVENOUS ONCE
Status: DISCONTINUED | OUTPATIENT
Start: 2019-07-09 | End: 2019-07-10

## 2019-07-09 RX ORDER — DEXTROSE MONOHYDRATE 25 G/50ML
50 INJECTION, SOLUTION INTRAVENOUS
Status: CANCELLED | OUTPATIENT
Start: 2019-07-09

## 2019-07-09 RX ORDER — NALOXONE HYDROCHLORIDE 0.4 MG/ML
80 INJECTION, SOLUTION INTRAMUSCULAR; INTRAVENOUS; SUBCUTANEOUS AS NEEDED
Status: CANCELLED | OUTPATIENT
Start: 2019-07-09 | End: 2019-07-09

## 2019-07-09 RX ORDER — PROPRANOLOL HYDROCHLORIDE 10 MG/1
10 TABLET ORAL 3 TIMES DAILY
Status: DISCONTINUED | OUTPATIENT
Start: 2019-07-09 | End: 2019-07-09

## 2019-07-09 NOTE — PROGRESS NOTES
[address] Patient Status:  Inpatient    1960 MRN [id number]   [facility] 4SW-A Attending Marthenia Goodell, MD   Hosp Day # 1 PCP Bassem Izaguirre MD     Critical Care Progress Note     Date of Admis 100 UNIT/ML flextouch 18 Units, 18 Units, Subcutaneous, Nightly  •  dextrose 5 %-0.45 % NaCl infusion, , Intravenous, Continuous  •  Insulin Aspart Pen (NOVOLOG) 100 UNIT/ML flexpen 1-5 Units, 1-5 Units, Subcutaneous, 4 times per day  •  Pantoprazole Sodiu 07/08/2019    AST 47 07/08/2019    BILT 1.7 07/08/2019    ALB 2.7 07/08/2019    TP 6.6 07/08/2019     Lab Results   Component Value Date    INR 1.25 (H) 07/09/2019    INR 1.31 (H) 07/08/2019    INR 1.35 (H) 05/29/2019           Imaging: I have independentl

## 2019-07-09 NOTE — PROGRESS NOTES
[facility] Hospitalist Progress Note                                                                   [address]  5/23/1960    SUBJECTIVE:  No bleeding overnight.  tmax 100.8.       OBJECTIVE infusion 8 mg/hr (07/09/19 0537)     PRN: ondansetron HCl, Albuterol Sulfate HFA, glucose **OR** Glucose-Vitamin C **OR** dextrose **OR** glucose **OR** Glucose-Vitamin C    Assessment/Plan:  Principal Problem:    Upper GI bleeding  Active Problems:    Met

## 2019-07-09 NOTE — PAYOR COMM NOTE
--------------  ADMISSION REVIEW     Payor: CESAR MACDONALD  Subscriber #:  [id number]  Authorization Number: [id number]    Admit date: 7/8/19  Admit time: 0104         Patient Seen in: [facility] 4sw-a    History   Patient presents with:  GI Bleeding (gas taken   • ESOPHAGOGASTRODUODENOSCOPY (EGD) N/A 5/28/2019    Performed by Miguel A Gallardo MD at [facility] ENDOSCOPY   • LAPAROSCOPY PROCEDURE UNLISTED      ovarian cyst     Review of Systems    Positive for stated complaint: GI bleed    Physical Exam     ED Tri components within normal limits   POCT GLUCOSE - Abnormal; Notable for the following components:    POC Glucose 324 (*)     All other components within normal limits   CBC W/ DIFFERENTIAL - Abnormal; Notable for the following components:    RBC 2.73 (*) taken   • ESOPHAGOGASTRODUODENOSCOPY (EGD) N/A 5/28/2019    Performed by Moises Lyle MD at [facility] ENDOSCOPY   • LAPAROSCOPY PROCEDURE UNLISTED      ovarian cyst        ALL:    Bactrim [Sulfametho*    HIVES  Cleocin [Clindamyci*    ITCHING    Comment:Chin found.     Assessment/Plan:       63 yr old female with PMH sig for EtOH abuse, chronic anemia, asthma, and DM II who presented to the ED c/o multiple episodes of hematemesis, weakness, and dizziness.       #Acute upper GI bleed   #Portal hypertensive shefali • sodium chloride 0.9%  1,000 mL Intravenous Once   • Fluticasone Furoate-Vilanterol  1 puff Inhalation Daily   • Insulin Aspart Pen  1-10 Units Subcutaneous TID CC   • insulin detemir  18 Units Subcutaneous Nightly   • Insulin Aspart Pen  1-5 Units Subc Action Dose Route     7/9/2019 0901 Given 1 puff Inhalation       Insulin Aspart Pen (NOVOLOG) 100 UNIT/ML flexpen 1-5 Units     Date Action Dose Route     7/8/2019 1722 Given 5 Units Subcutaneous (Left Upper Arm)       Insulin Aspart Pen (NOVOLOG) 100 UNI

## 2019-07-09 NOTE — ANESTHESIA POSTPROCEDURE EVALUATION
[address] Patient Status:  Inpatient   Age/Gender 63year old female MRN [id number]   Location [facility]. Attending Zainab Perez, [address] [facility] Se Day # 1 PCP Dajuan Krishnan MD       Anesthesia Post-op Note

## 2019-07-09 NOTE — INTERVAL H&P NOTE
Pre-op Diagnosis: INPATIENT    The above referenced H&P was reviewed by Joanie Gómez MD on 7/9/2019, the patient was examined and no significant changes have occurred in the patient's condition since the H&P was performed.   I discussed with the patient and/o

## 2019-07-09 NOTE — ED PROVIDER NOTES
Patient Seen in: [facility] 4sw-a    History   Patient presents with:  GI Bleeding (gastrointestinal)    Stated Complaint: GI bleed    HPI    50-year-old woman presents to the emergency department she was seen by myself a few weeks ago for similar p Family history reviewed and is not pertinent to presenting problem.     Social History    Tobacco Use      Smoking status: Never Smoker      Smokeless tobacco: Never Used    Alcohol use: Not Currently      Alcohol/week: 0.0 oz    Drug use: No      Rev (PT) - Abnormal; Notable for the following components:    PT 16.9 (*)     INR 1.31 (*)     All other components within normal limits   LIPID PANEL - Abnormal; Notable for the following components:    HDL Cholesterol 28 (*)     All other components within n CULTURE ONLY         No orders to display      MDM   Has no active vomiting here in the emergency department of her hemoglobin is 7.2.   It was 7.7 the day that she received an iron transfusion last week so I am unsure exactly how much blood she vomited she

## 2019-07-09 NOTE — PROGRESS NOTES
Pt received from the ED. BP stable. Room air. 1Unit PRBC's infusing. Octreotide and Protonix infusing. Elevated BS treated. Dr. Mike Heart answered questions. EGD for tomorrow. VSS. Will monitor in the ICU.

## 2019-07-09 NOTE — DIETARY NOTE
[address] [facility]     Admitting diagnosis:  Anemia due to acute blood loss [D62]  Upper GI bleeding [K92.2]  Es[facility] varices [facility] [I85.00]  Type 2 diabetes mellitus with hyperglycemia, with

## 2019-07-09 NOTE — PROGRESS NOTES
ENDOCRINOLOGY PROGRESS NOTE    Typed by Latricia Gill MD on 7/9/2019      S:  Pt returned from EGD - this showed severe portal gastropathy. Now back in ICU room - starting liquid diet.  at bedside.       O:  /47   Pulse 71   Temp 100 Plan:    1. Type 2 DM: uncontrolled with hyperglycemia. HgA1C 7.7% but may be skewed due to anemia from GI blood loss.   2. Long term insulin use       · Continue regimen as follows:     Levemir 18 Units at bedtime     Novolog 1 Unit for every 10 grams of c

## 2019-07-09 NOTE — OPERATIVE REPORT
[facility]                                                                                                        EGD Operative Report    Margareth Aiken Patient Status:  Inpatient     examine the angulus, GE junction, cardia, body and fundus and then withdrawn to examine the esophagus. The endoscope was then removed from the patient.  The patient tolerated the procedure well with no immediate complications and was transferred to the anoop

## 2019-07-09 NOTE — PLAN OF CARE
Received patient at 0700. A&Ox4. AVSS. Ambulating in room independently with set up. No lightheadness, dizziness or syncope indicative of anemia. Potassium replacement infusing. PIV c/d/I. Plan for EGD @ 1300. Returned from EGD in stable condition.  A&O

## 2019-07-09 NOTE — PLAN OF CARE
Received pt alert, oriented, and following commands. Able to ambulate independently. On room air with clear breath sounds. Temperature max 100.8. Normal sinus rhythm. Hypotension overnight while pt asleep. Improvement when pt awake.  SCD's in place for DVT

## 2019-07-10 VITALS
HEIGHT: 62 IN | BODY MASS INDEX: 29.38 KG/M2 | OXYGEN SATURATION: 97 % | WEIGHT: 159.63 LBS | RESPIRATION RATE: 19 BRPM | HEART RATE: 67 BPM | TEMPERATURE: 99 F | DIASTOLIC BLOOD PRESSURE: 56 MMHG | SYSTOLIC BLOOD PRESSURE: 109 MMHG

## 2019-07-10 LAB
ANION GAP SERPL CALC-SCNC: 8 MMOL/L (ref 0–18)
BASOPHILS # BLD AUTO: 0.02 X10(3) UL (ref 0–0.2)
BASOPHILS NFR BLD AUTO: 0.4 %
BLOOD TYPE BARCODE: 5100
BUN BLD-MCNC: 11 MG/DL (ref 7–18)
BUN/CREAT SERPL: 16.2 (ref 10–20)
CALCIUM BLD-MCNC: 8.8 MG/DL (ref 8.5–10.1)
CHLORIDE SERPL-SCNC: 113 MMOL/L (ref 98–112)
CO2 SERPL-SCNC: 23 MMOL/L (ref 21–32)
CREAT BLD-MCNC: 0.68 MG/DL (ref 0.55–1.02)
DEPRECATED RDW RBC AUTO: 68 FL (ref 35.1–46.3)
EOSINOPHIL # BLD AUTO: 0.17 X10(3) UL (ref 0–0.7)
EOSINOPHIL NFR BLD AUTO: 3.7 %
ERYTHROCYTE [DISTWIDTH] IN BLOOD BY AUTOMATED COUNT: 23.9 % (ref 11–15)
GLUCOSE BLD-MCNC: 234 MG/DL (ref 70–99)
GLUCOSE BLD-MCNC: 318 MG/DL (ref 70–99)
GLUCOSE BLD-MCNC: 66 MG/DL (ref 70–99)
GLUCOSE BLD-MCNC: 80 MG/DL (ref 70–99)
GLUCOSE BLD-MCNC: 87 MG/DL (ref 70–99)
HCT VFR BLD AUTO: 28.2 % (ref 35–48)
HGB BLD-MCNC: 8.6 G/DL (ref 12–16)
HGB BLD-MCNC: 9.2 G/DL (ref 12–16)
IMM GRANULOCYTES # BLD AUTO: 0.01 X10(3) UL (ref 0–1)
IMM GRANULOCYTES NFR BLD: 0.2 %
LYMPHOCYTES # BLD AUTO: 1.04 X10(3) UL (ref 1–4)
LYMPHOCYTES NFR BLD AUTO: 22.4 %
MCH RBC QN AUTO: 28.5 PG (ref 26–34)
MCHC RBC AUTO-ENTMCNC: 32.6 G/DL (ref 31–37)
MCV RBC AUTO: 87.3 FL (ref 80–100)
MONOCYTES # BLD AUTO: 0.79 X10(3) UL (ref 0.1–1)
MONOCYTES NFR BLD AUTO: 17 %
NEUTROPHILS # BLD AUTO: 2.62 X10 (3) UL (ref 1.5–7.7)
NEUTROPHILS # BLD AUTO: 2.62 X10(3) UL (ref 1.5–7.7)
NEUTROPHILS NFR BLD AUTO: 56.3 %
OSMOLALITY SERPL CALC.SUM OF ELEC: 296 MOSM/KG (ref 275–295)
PLATELET # BLD AUTO: 100 10(3)UL (ref 150–450)
POTASSIUM SERPL-SCNC: 3.6 MMOL/L (ref 3.5–5.1)
POTASSIUM SERPL-SCNC: 4.2 MMOL/L (ref 3.5–5.1)
RBC # BLD AUTO: 3.23 X10(6)UL (ref 3.8–5.3)
SODIUM SERPL-SCNC: 144 MMOL/L (ref 136–145)
WBC # BLD AUTO: 4.7 X10(3) UL (ref 4–11)

## 2019-07-10 PROCEDURE — 84132 ASSAY OF SERUM POTASSIUM: CPT | Performed by: NURSE PRACTITIONER

## 2019-07-10 PROCEDURE — 85018 HEMOGLOBIN: CPT | Performed by: HOSPITALIST

## 2019-07-10 PROCEDURE — 85025 COMPLETE CBC W/AUTO DIFF WBC: CPT | Performed by: NURSE PRACTITIONER

## 2019-07-10 PROCEDURE — 80048 BASIC METABOLIC PNL TOTAL CA: CPT | Performed by: NURSE PRACTITIONER

## 2019-07-10 PROCEDURE — 82962 GLUCOSE BLOOD TEST: CPT

## 2019-07-10 RX ORDER — INSULIN DETEMIR 100 [IU]/ML
INJECTION, SOLUTION SUBCUTANEOUS
Qty: 5 PEN | Refills: 0 | Status: SHIPPED | OUTPATIENT
Start: 2019-07-10 | End: 2019-07-23

## 2019-07-10 RX ORDER — POTASSIUM CHLORIDE 20 MEQ/1
40 TABLET, EXTENDED RELEASE ORAL EVERY 4 HOURS
Status: COMPLETED | OUTPATIENT
Start: 2019-07-10 | End: 2019-07-10

## 2019-07-10 RX ORDER — PANTOPRAZOLE SODIUM 40 MG/1
40 TABLET, DELAYED RELEASE ORAL
Qty: 30 TABLET | Refills: 3 | Status: SHIPPED | OUTPATIENT
Start: 2019-07-11 | End: 2019-07-17

## 2019-07-10 RX ORDER — PROPRANOLOL HYDROCHLORIDE 10 MG/1
10 TABLET ORAL 3 TIMES DAILY
Qty: 90 TABLET | Refills: 1 | Status: SHIPPED | OUTPATIENT
Start: 2019-07-10 | End: 2019-07-17

## 2019-07-10 NOTE — PLAN OF CARE
Received pt alert, oriented, and following commands. Able to ambulate independently. On room air with clear breath sounds. Afebrile. Normal sinus rhythm. Hypotension overnight while pt asleep. Improvement when pt awake. SCD's in place for DVT prophylaxis.

## 2019-07-10 NOTE — PROGRESS NOTES
ENDOCRINOLOGY PROGRESS NOTE    Typed by Tawny Zavala MD on 7/10/2019      S: Pt to be discharged today. Pt has been learning how to use the insulin pen.         O: /48   Pulse 68   Temp 98.4 °F (36.9 °C) (Oral)   Resp 17   Ht 62\"   Wt 159 Platelet Count      896.6 - 450.0 10(3)uL 100.0 (L)         Assessment and Plan:    1. Type 2 DM: uncontrolled with hyperglycemia. HgA1C 7.7% but may be skewed due to anemia from GI blood loss.   2. Long term insulin use       · Continue regimen as follow the Levemir and pen needles to the pharmacy electronically. I went over the insulin instructions in detail with her. I scheduled the appt with our office. I spoke with pt's nurse.       Omayra Marquez MD  Endocrinology, Diabetes and Metabolism  [facility]

## 2019-07-10 NOTE — PROGRESS NOTES
Pulmonary Progress Note        NAME: Lorne Clock - ROOM: 918/272-S - MRN: [id number] - Age: 63year old - : 1960        Last 24hrs: No events overnight, no complaints this AM, hopeful to go home today    OBJECTIVE:   07/10/19  0500 07/10/19  05 1.25*  --   --        Recent Labs     07/08/19  1048 07/09/19  0428 07/10/19  0429   * 139 144   K 4.3 3.6 3.6    110 113*   CO2 21.0 23.0 23.0   BUN 19* 16 11   CA 9.6 8.9 8.8   MG  --  2.0  --    PHOS  --  4.0  --        Recent Labs     07/08 08/15/2008 03:03 PM 6.219 (H) 0.350 - 5.500 uIU/mL Final     Comment:                    Adult TSH concentrations below 5.5 uIU/mL do not                 rule out the presence of subclinical hypothyroidism.

## 2019-07-10 NOTE — PROGRESS NOTES
[facility]    Progress Note    Karey Brown Patient Status:  Inpatient    1960 MRN [id number]   [facility] 4SW-A Attending Jitendra Gong MD   Hosp Day # 2 PCP Marcella Contreras MD     Subjective:  Matt Gibson portal hypertensive gastropathy and bleeding. Plan:  Continue propranolol at 10 mg tid and will push dose up as outpt. Ok to d/c home from a GI standpoint. Pt already has f/u with Dr. Louann Quintana as outpt scheduled.     Russell Matthews  7/10/2019  10:58 AM

## 2019-07-10 NOTE — PLAN OF CARE
Problem: GASTROINTESTINAL - ADULT  Goal: Minimal or absence of nausea and vomiting  Description  INTERVENTIONS:  - Maintain adequate hydration with IV or PO as ordered and tolerated  - Nasogastric tube to low intermittent suction as ordered  - Evaluate e color and temperature  7/10/2019 1708 by Loney Snellen, RN  Outcome: Adequate for Discharge  7/10/2019 0955 by Loney Snellen, RN  Outcome: Progressing  Goal: Absence of cardiac arrhythmias or at baseline  Description  INTERVENTIONS:  - Monitor vital si as ordered  - Obtain nutritional consult as needed  7/10/2019 1708 by Jarred Corea, RN  Outcome: Adequate for Discharge  7/10/2019 0955 by Jarred Corea, RN  Outcome: Progressing

## 2019-07-10 NOTE — PLAN OF CARE
Upon assessment pt resting in bed on room air, alert and oriented, denies pain. Pt up to bathroom to void, tolerating well. Plan for pt to d/c from hospital on insulin per endo note, diabetes educations started.  Pt periodically hypotensive, repeat hmg draw Instruct patient on fluid and nutrition restrictions as appropriate  Outcome: Progressing

## 2019-07-10 NOTE — DISCHARGE SUMMARY
General Medicine Discharge Summary     Patient ID:  Hershall Councilman  63year old  5/23/1960    Admit date: 7/8/2019    Discharge date and time: 7/10/2019    Attending Physician: Sherman Antonio MD with accuchecks  - apprec endocrinology recs -- insulin started     #Asthma  - stable  - cont home inhalers     # Fever  - ? Atelectasis.   Low grade temps overall downtrending over last 2 days  - no leukocytosis  - no cough/sputum production, no dysuria Cap  Take 1 capsule by mouth daily. Levonorgestrel (MIRENA, 52 MG, IU)  by Intrauterine route.       STOP taking these medications    aspirin 81 MG Oral Tab EC            Code Status: Full Code      Exam on day of discharge:      07/10/19  1400   BP:

## 2019-07-12 PROBLEM — E78.2 MIXED HYPERLIPIDEMIA: Status: ACTIVE | Noted: 2019-07-12

## 2019-07-23 PROBLEM — E78.5 HYPERLIPIDEMIA ASSOCIATED WITH TYPE 2 DIABETES MELLITUS  (HCC): Status: ACTIVE | Noted: 2019-07-23

## 2019-07-23 PROBLEM — E11.69 HYPERLIPIDEMIA ASSOCIATED WITH TYPE 2 DIABETES MELLITUS  (HCC): Status: ACTIVE | Noted: 2019-07-23

## 2019-07-23 PROBLEM — E78.5 HYPERLIPIDEMIA ASSOCIATED WITH TYPE 2 DIABETES MELLITUS (HCC): Status: ACTIVE | Noted: 2019-07-23

## 2019-07-23 PROBLEM — E11.69 HYPERLIPIDEMIA ASSOCIATED WITH TYPE 2 DIABETES MELLITUS (HCC): Status: ACTIVE | Noted: 2019-07-23

## 2019-07-23 PROBLEM — E11.69 HYPERLIPIDEMIA ASSOCIATED WITH TYPE 2 DIABETES MELLITUS: Status: ACTIVE | Noted: 2019-07-23

## 2019-07-23 PROBLEM — E78.5 HYPERLIPIDEMIA ASSOCIATED WITH TYPE 2 DIABETES MELLITUS: Status: ACTIVE | Noted: 2019-07-23

## 2019-10-14 PROBLEM — D50.8 OTHER IRON DEFICIENCY ANEMIA: Status: ACTIVE | Noted: 2019-06-28

## 2020-11-13 ENCOUNTER — APPOINTMENT (OUTPATIENT)
Dept: ULTRASOUND IMAGING | Facility: HOSPITAL | Age: 60
End: 2020-11-13
Attending: EMERGENCY MEDICINE
Payer: COMMERCIAL

## 2020-11-13 ENCOUNTER — APPOINTMENT (OUTPATIENT)
Dept: CT IMAGING | Facility: HOSPITAL | Age: 60
End: 2020-11-13
Attending: EMERGENCY MEDICINE
Payer: COMMERCIAL

## 2020-11-13 PROCEDURE — 49083 ABD PARACENTESIS W/IMAGING: CPT | Performed by: EMERGENCY MEDICINE

## 2020-11-13 PROCEDURE — 74177 CT ABD & PELVIS W/CONTRAST: CPT | Performed by: EMERGENCY MEDICINE

## 2020-11-13 NOTE — ED INITIAL ASSESSMENT (HPI)
Pt here today for abdominal bloating that patient reports it started after she had the flu two weeks ago  ~ MD informed patient that he thinks it is from Cirrhosis and she needs it drained.  Patient denies ever needing this drained before

## 2020-11-14 NOTE — ED PROVIDER NOTES
Patient is a 41-year-old female with alcoholic cirrhosis who presented to the ER for abdominal distention and pain. Patient was initially evaluated by my partner, please refer to their documentation for additional details.     Patient's care was turned ove

## 2020-11-14 NOTE — ED NOTES
Patient ready for discharge. Follow-up with your PCP and GI discussed. Patient told to wear compression stockings for her leg swelling. Patient AOx3 with no signs of acute distress.

## 2020-11-14 NOTE — ED PROVIDER NOTES
Patient Seen in: [facility] Emergency Department      History   Patient presents with:  Abdomen/Flank Pain    Stated Complaint: MD told pt to go to the ED to get her stomach drained.     HPI    42-year-old with past medical history of alcoholic cirrh Smoker      Smokeless tobacco: Never Used    Alcohol use: Not Currently      Alcohol/week: 0.0 standard drinks      Comment: Not drinking since 2018    Drug use: Never             Review of Systems    Positive for stated complaint: MD told pt to go to the components:       Result Value    Glucose 57 (*)     AST 41 (*)     Alkaline Phosphatase 142 (*)     Albumin 2.8 (*)     A/G Ratio 0.6 (*)     All other components within normal limits   PROTHROMBIN TIME (PT) - Abnormal; Notable for the following component used. Dose information is transmitted to the [facility] ([facility]) [address] ([address]) which includes the Dose Index Registry.   PATIENT STATED HISTORY:(As transcribed by Technologist)  Patient has abdominal bloating that i consistent with portal hypertension. 3. Moderate amount of abdominal ascites related to cirrhosis.     Dictated by (CST): Charmaine Garces MD on 11/13/2020 at 9:09 PM     Finalized by (CST): Charmaine Garces MD on 11/13/2020 at 9:17 PM         [facility]      60-year-o

## 2020-11-23 ENCOUNTER — NURSE ONLY (OUTPATIENT)
Dept: LAB | Facility: HOSPITAL | Age: 60
End: 2020-11-23
Attending: INTERNAL MEDICINE
Payer: COMMERCIAL

## 2020-11-23 ENCOUNTER — HOSPITAL ENCOUNTER (OUTPATIENT)
Dept: ULTRASOUND IMAGING | Facility: HOSPITAL | Age: 60
Discharge: HOME | End: 2020-11-23
Attending: INTERNAL MEDICINE
Payer: COMMERCIAL

## 2020-11-23 VITALS
OXYGEN SATURATION: 100 % | SYSTOLIC BLOOD PRESSURE: 115 MMHG | TEMPERATURE: 97 F | DIASTOLIC BLOOD PRESSURE: 51 MMHG | HEART RATE: 75 BPM | WEIGHT: 182 LBS | RESPIRATION RATE: 15 BRPM | HEIGHT: 64 IN | BODY MASS INDEX: 31.07 KG/M2

## 2020-11-23 DIAGNOSIS — R18.8 ASCITES: ICD-10-CM

## 2020-11-23 DIAGNOSIS — R18.8 ASCITES: Primary | ICD-10-CM

## 2020-11-23 DIAGNOSIS — E03.9 ACQUIRED HYPOTHYROIDISM: ICD-10-CM

## 2020-11-23 DIAGNOSIS — K70.31 ALCOHOLIC CIRRHOSIS OF LIVER WITH ASCITES (HCC): ICD-10-CM

## 2020-11-23 PROCEDURE — 84439 ASSAY OF FREE THYROXINE: CPT

## 2020-11-23 PROCEDURE — 36415 COLL VENOUS BLD VENIPUNCTURE: CPT

## 2020-11-23 PROCEDURE — 85610 PROTHROMBIN TIME: CPT

## 2020-11-23 PROCEDURE — 80048 BASIC METABOLIC PNL TOTAL CA: CPT

## 2020-11-23 PROCEDURE — 82962 GLUCOSE BLOOD TEST: CPT

## 2020-11-23 PROCEDURE — 84443 ASSAY THYROID STIM HORMONE: CPT

## 2020-11-23 PROCEDURE — 76705 ECHO EXAM OF ABDOMEN: CPT | Performed by: INTERNAL MEDICINE

## 2020-11-23 NOTE — PROGRESS NOTES
Labs stable. Potassium a little low. Cont current meds. Repeat labs in 1 week. Keep office appt    Please let patient know.     Sent to [address]

## 2021-01-07 PROBLEM — D50.8 IRON DEFICIENCY ANEMIA REFRACTORY TO IRON THERAPY: Status: ACTIVE | Noted: 2021-01-07

## 2021-01-15 ENCOUNTER — LAB ENCOUNTER (OUTPATIENT)
Dept: LAB | Facility: HOSPITAL | Age: 61
End: 2021-01-15
Attending: RADIOLOGY
Payer: COMMERCIAL

## 2021-01-15 DIAGNOSIS — R18.8 OTHER ASCITES: ICD-10-CM

## 2021-01-15 DIAGNOSIS — K70.31 ALCOHOLIC CIRRHOSIS OF LIVER WITH ASCITES (HCC): ICD-10-CM

## 2021-01-15 LAB
ANION GAP SERPL CALC-SCNC: 6 MMOL/L (ref 0–18)
BUN BLD-MCNC: 8 MG/DL (ref 7–18)
BUN/CREAT SERPL: 8.3 (ref 10–20)
CALCIUM BLD-MCNC: 9 MG/DL (ref 8.5–10.1)
CHLORIDE SERPL-SCNC: 109 MMOL/L (ref 98–112)
CO2 SERPL-SCNC: 26 MMOL/L (ref 21–32)
CREAT BLD-MCNC: 0.96 MG/DL
GLUCOSE BLD-MCNC: 180 MG/DL (ref 70–99)
INR BLD: 1.46 (ref 0.89–1.11)
OSMOLALITY SERPL CALC.SUM OF ELEC: 295 MOSM/KG (ref 275–295)
PATIENT FASTING Y/N/NP: YES
POTASSIUM SERPL-SCNC: 4.1 MMOL/L (ref 3.5–5.1)
PSA SERPL DL<=0.01 NG/ML-MCNC: 18.1 SECONDS (ref 12.4–14.6)
SODIUM SERPL-SCNC: 141 MMOL/L (ref 136–145)

## 2021-01-15 PROCEDURE — 85610 PROTHROMBIN TIME: CPT

## 2021-01-15 PROCEDURE — 80048 BASIC METABOLIC PNL TOTAL CA: CPT

## 2021-01-15 PROCEDURE — 36415 COLL VENOUS BLD VENIPUNCTURE: CPT

## 2021-01-15 NOTE — IMAGING NOTE
Report given to [facility] 1.1 litre removed. Vitals  Documented.  Patient transported to [phone] by cart

## 2021-01-15 NOTE — PROCEDURES
PROCEDURE: US PARACENTESIS W IMAGING  (WSF=01715)    COMPARISON: US JOSHUA, US PARACENTESIS W IMAGING (UOA=[id number]), 11/13/2020, 10:21 PM.    INDICATIONS: H81.44 Alcoholic cirrhosis of liver with ascites ([facility] [address]    DESCRIPTION OF PROCEDURE: The risks, benefi

## 2021-01-15 NOTE — PROGRESS NOTES
Labs look good. Should be on:    1. Spironolactone 100 mg daily. 2. Lasix 40 mg daily. Will increase doses if ascites keeps coming back. Please let patient know.     Sent to [address]

## 2021-02-17 PROBLEM — E11.69 DYSLIPIDEMIA ASSOCIATED WITH TYPE 2 DIABETES MELLITUS (HCC): Status: ACTIVE | Noted: 2019-07-08

## 2021-02-17 PROBLEM — E11.69 DYSLIPIDEMIA ASSOCIATED WITH TYPE 2 DIABETES MELLITUS: Status: ACTIVE | Noted: 2019-07-08

## 2021-02-17 PROBLEM — E78.5 DYSLIPIDEMIA ASSOCIATED WITH TYPE 2 DIABETES MELLITUS (HCC): Status: ACTIVE | Noted: 2019-07-08

## 2021-02-17 PROBLEM — E78.5 DYSLIPIDEMIA ASSOCIATED WITH TYPE 2 DIABETES MELLITUS  (HCC): Status: ACTIVE | Noted: 2019-07-08

## 2021-02-17 PROBLEM — E78.5 DYSLIPIDEMIA ASSOCIATED WITH TYPE 2 DIABETES MELLITUS: Status: ACTIVE | Noted: 2019-07-08

## 2021-02-17 PROBLEM — E11.69 DYSLIPIDEMIA ASSOCIATED WITH TYPE 2 DIABETES MELLITUS  (HCC): Status: ACTIVE | Noted: 2019-07-08

## 2021-06-05 PROCEDURE — 3051F HG A1C>EQUAL 7.0%<8.0%: CPT | Performed by: NURSE PRACTITIONER

## 2021-06-23 ENCOUNTER — NURSE ONLY (OUTPATIENT)
Dept: LAB | Facility: HOSPITAL | Age: 61
End: 2021-06-23
Attending: INTERNAL MEDICINE
Payer: COMMERCIAL

## 2021-06-23 DIAGNOSIS — K70.31 ALCOHOLIC CIRRHOSIS OF LIVER WITH ASCITES (HCC): ICD-10-CM

## 2021-06-23 PROCEDURE — 85610 PROTHROMBIN TIME: CPT

## 2021-06-23 PROCEDURE — 36415 COLL VENOUS BLD VENIPUNCTURE: CPT

## 2021-06-23 NOTE — IMAGING NOTE
Arrived to [facility] at 5191 to evaluated different patient for planned procedure. Called to this patient's room and she stated she felt like her blood sugar \"must be low\" and that her \"stomach was cramping. \" Pt is alert and oriented, vitals st

## 2021-06-23 NOTE — IMAGING NOTE
REceived report from [employer]. Patient is actively vomiting from 2 ounces of glucose from a blood sugar of 74. Patient states after vomiting episode, she is relieved. Patient states she did not prefer the taste from glucose that is why she threw-up.   Accu

## 2021-06-23 NOTE — PROGRESS NOTES
Noted    Continue medications. Electrolyte blood tests ordered for today.     Sent to [address]

## 2021-06-29 ENCOUNTER — OFFICE VISIT (OUTPATIENT)
Dept: PODIATRY CLINIC | Facility: CLINIC | Age: 61
End: 2021-06-29
Payer: COMMERCIAL

## 2021-06-29 ENCOUNTER — TELEPHONE (OUTPATIENT)
Dept: PODIATRY CLINIC | Facility: CLINIC | Age: 61
End: 2021-06-29

## 2021-06-29 VITALS — HEIGHT: 64 IN | BODY MASS INDEX: 29.71 KG/M2 | WEIGHT: 174 LBS

## 2021-06-29 DIAGNOSIS — B35.1 ONYCHOMYCOSIS: Primary | ICD-10-CM

## 2021-06-29 DIAGNOSIS — B35.3 TINEA PEDIS OF RIGHT FOOT: ICD-10-CM

## 2021-06-29 DIAGNOSIS — E11.65 UNCONTROLLED TYPE 2 DIABETES MELLITUS WITH HYPERGLYCEMIA, WITHOUT LONG-TERM CURRENT USE OF INSULIN (HCC): ICD-10-CM

## 2021-06-29 DIAGNOSIS — L30.9 DERMATITIS OF RIGHT FOOT: ICD-10-CM

## 2021-06-29 PROCEDURE — 99203 OFFICE O/P NEW LOW 30 MIN: CPT | Performed by: PODIATRIST

## 2021-06-29 PROCEDURE — 3008F BODY MASS INDEX DOCD: CPT | Performed by: PODIATRIST

## 2021-06-29 RX ORDER — CLOTRIMAZOLE AND BETAMETHASONE DIPROPIONATE 10; .64 MG/G; MG/G
CREAM TOPICAL
Qty: 45 G | Refills: 3 | Status: SHIPPED | OUTPATIENT
Start: 2021-06-29 | End: 2021-10-04

## 2021-06-29 NOTE — PROGRESS NOTES
Radha Laws is a 62year old female. Patient presents with:  Diabetic Foot Care: pt in for DM foot check, FBS this am 141, last A1c 6/5/21- 7.3        HPI:   Pleasant patient returns to the clinic I seen her before a long time ago.   I did a nail surge spironolactone 50 MG Oral Tab Take 1 tablet (50 mg total) by mouth daily. 30 tablet 11   • atorvastatin 10 MG Oral Tab Take 1 tablet (10 mg total) by mouth daily. 90 tablet 0   • Empagliflozin (JARDIANCE) 25 MG Oral Tab Take 1 tablet by mouth daily.  90 tab • Diabetes ([facility] 06/2017   • Diabetes mellitus ([facility]    • Elevated lipids 06/2017   • Elevated liver enzymes 2014   • Encounter for insertion of mirena IUD 6/2012    per pt   • Esophageal varices ([facility] 08/2017   • High cholesterol    • History of recent Smoking status: Never Smoker      Smokeless tobacco: Never Used    Vaping Use      Vaping Use: Never used    Substance and Sexual Activity      Alcohol use: Not Currently        Alcohol/week: 0.0 standard drinks        Comment: former alcohol abuse- Not dr Future    Uncontrolled type 2 diabetes mellitus with hyperglycemia, without long-term current use of insulin (HCC)    Tinea pedis of right foot    Dermatitis of right foot    Other orders  -     clotrimazole-betamethasone 1-0.05 % External Cream; Rubbing t

## 2021-06-29 NOTE — TELEPHONE ENCOUNTER
Pt had appt at [facility] today. Per [facility] note pt needs f/u appt in 2 wks. [place] - please call to schedule pt.

## 2021-06-29 NOTE — TELEPHONE ENCOUNTER
Pt was seen today, was advised to f/u in 2 weeks, no appointments available. Please advise thank you.

## 2021-07-14 ENCOUNTER — TELEPHONE (OUTPATIENT)
Dept: PODIATRY CLINIC | Facility: CLINIC | Age: 61
End: 2021-07-14

## 2021-07-14 NOTE — TELEPHONE ENCOUNTER
Patient returned call. She is not interested in oral lamisil therapy. She would like to pursue topical option, BC/BS PPO. She is aware that topicals may not be covered. Dr. Alexis Hollingsworth please advise.

## 2021-07-14 NOTE — TELEPHONE ENCOUNTER
----- Message from Alejandra Thomas DPM sent at 7/12/2021  6:21 PM CDT -----  Results reviewed. Please inform patient that fungal culture results are positive and we should proceed with Lamisil tablet therapy.   If the patient agrees we have to do a hepa

## 2021-07-15 NOTE — TELEPHONE ENCOUNTER
Please call in [address] 8% solution, 1 bottle apply to affected toenails once daily as directed 6 refills

## 2021-07-21 NOTE — TELEPHONE ENCOUNTER
Dr. Josué Serrano, when trying to order Efinaconazole (JUBLIA) 10 % External Solution it states this is not on the preferred formulary for the patient's insurance plan. Do you recommend anything else? Ciclopirox?

## 2021-07-26 NOTE — TELEPHONE ENCOUNTER
LM on pt's preferred # and informed that Zari ordered a topical med and it was sent to her [facility]. Advised pt to call back if she has any questions.

## 2021-09-08 ENCOUNTER — TELEPHONE (OUTPATIENT)
Dept: PODIATRY CLINIC | Facility: CLINIC | Age: 61
End: 2021-09-08

## 2021-09-08 NOTE — TELEPHONE ENCOUNTER
Patient called stating that the prescription sent to [facility] in July did not state it was medically necessary and the patients insurance was not covering the medication.   Patient stated that she does not want an oral medication but wants a topical and wo

## 2021-09-10 PROCEDURE — 3044F HG A1C LEVEL LT 7.0%: CPT | Performed by: NURSE PRACTITIONER

## 2021-09-11 ENCOUNTER — HOSPITAL ENCOUNTER (OUTPATIENT)
Facility: HOSPITAL | Age: 61
Setting detail: OBSERVATION
Discharge: HOME | End: 2021-09-13
Attending: HOSPITALIST | Admitting: HOSPITALIST
Payer: COMMERCIAL

## 2021-09-11 PROBLEM — L03.90 CELLULITIS: Status: ACTIVE | Noted: 2021-09-11

## 2021-09-11 LAB
ANION GAP SERPL CALC-SCNC: 7 MMOL/L (ref 0–18)
BASOPHILS # BLD AUTO: 0.01 X10(3) UL (ref 0–0.2)
BASOPHILS NFR BLD AUTO: 0.3 %
BUN BLD-MCNC: 12 MG/DL (ref 7–18)
CALCIUM BLD-MCNC: 8.9 MG/DL (ref 8.5–10.1)
CHLORIDE SERPL-SCNC: 110 MMOL/L (ref 98–112)
CO2 SERPL-SCNC: 25 MMOL/L (ref 21–32)
CREAT BLD-MCNC: 0.72 MG/DL
EOSINOPHIL # BLD AUTO: 0.12 X10(3) UL (ref 0–0.7)
EOSINOPHIL NFR BLD AUTO: 3.7 %
ERYTHROCYTE [DISTWIDTH] IN BLOOD BY AUTOMATED COUNT: 15.9 %
GLUCOSE BLD-MCNC: 129 MG/DL (ref 70–99)
GLUCOSE BLD-MCNC: 158 MG/DL (ref 70–99)
HCT VFR BLD AUTO: 30 %
HGB BLD-MCNC: 10 G/DL
IMM GRANULOCYTES # BLD AUTO: 0 X10(3) UL (ref 0–1)
IMM GRANULOCYTES NFR BLD: 0 %
LYMPHOCYTES # BLD AUTO: 0.82 X10(3) UL (ref 1–4)
LYMPHOCYTES NFR BLD AUTO: 25.2 %
MCH RBC QN AUTO: 32.1 PG (ref 26–34)
MCHC RBC AUTO-ENTMCNC: 33.3 G/DL (ref 31–37)
MCV RBC AUTO: 96.2 FL
MONOCYTES # BLD AUTO: 0.69 X10(3) UL (ref 0.1–1)
MONOCYTES NFR BLD AUTO: 21.2 %
NEUTROPHILS # BLD AUTO: 1.61 X10 (3) UL (ref 1.5–7.7)
NEUTROPHILS # BLD AUTO: 1.61 X10(3) UL (ref 1.5–7.7)
NEUTROPHILS NFR BLD AUTO: 49.6 %
OSMOLALITY SERPL CALC.SUM OF ELEC: 297 MOSM/KG (ref 275–295)
PLATELET # BLD AUTO: 62 10(3)UL (ref 150–450)
POTASSIUM SERPL-SCNC: 3.7 MMOL/L (ref 3.5–5.1)
RBC # BLD AUTO: 3.12 X10(6)UL
SODIUM SERPL-SCNC: 142 MMOL/L (ref 136–145)
WBC # BLD AUTO: 3.3 X10(3) UL (ref 4–11)

## 2021-09-11 PROCEDURE — 82962 GLUCOSE BLOOD TEST: CPT

## 2021-09-11 PROCEDURE — 96374 THER/PROPH/DIAG INJ IV PUSH: CPT

## 2021-09-11 PROCEDURE — 80048 BASIC METABOLIC PNL TOTAL CA: CPT | Performed by: HOSPITALIST

## 2021-09-11 PROCEDURE — 85025 COMPLETE CBC W/AUTO DIFF WBC: CPT | Performed by: HOSPITALIST

## 2021-09-11 PROCEDURE — 96372 THER/PROPH/DIAG INJ SC/IM: CPT

## 2021-09-11 RX ORDER — PROCHLORPERAZINE EDISYLATE 5 MG/ML
5 INJECTION INTRAMUSCULAR; INTRAVENOUS EVERY 8 HOURS PRN
Status: DISCONTINUED | OUTPATIENT
Start: 2021-09-11 | End: 2021-09-13

## 2021-09-11 RX ORDER — ACETAMINOPHEN 325 MG/1
650 TABLET ORAL EVERY 6 HOURS PRN
Status: DISCONTINUED | OUTPATIENT
Start: 2021-09-11 | End: 2021-09-13

## 2021-09-11 RX ORDER — ONDANSETRON 2 MG/ML
4 INJECTION INTRAMUSCULAR; INTRAVENOUS EVERY 6 HOURS PRN
Status: DISCONTINUED | OUTPATIENT
Start: 2021-09-11 | End: 2021-09-13

## 2021-09-11 RX ORDER — MONTELUKAST SODIUM 10 MG/1
10 TABLET ORAL DAILY
Status: DISCONTINUED | OUTPATIENT
Start: 2021-09-12 | End: 2021-09-13

## 2021-09-11 RX ORDER — LEVOTHYROXINE SODIUM 0.07 MG/1
75 TABLET ORAL
Status: DISCONTINUED | OUTPATIENT
Start: 2021-09-13 | End: 2021-09-13

## 2021-09-11 RX ORDER — FUROSEMIDE 40 MG/1
40 TABLET ORAL DAILY
Status: DISCONTINUED | OUTPATIENT
Start: 2021-09-12 | End: 2021-09-13

## 2021-09-11 RX ORDER — DEXTROSE MONOHYDRATE 25 G/50ML
50 INJECTION, SOLUTION INTRAVENOUS
Status: DISCONTINUED | OUTPATIENT
Start: 2021-09-11 | End: 2021-09-13

## 2021-09-11 RX ORDER — ASPIRIN 81 MG/1
81 TABLET ORAL DAILY
Status: DISCONTINUED | OUTPATIENT
Start: 2021-09-12 | End: 2021-09-13

## 2021-09-11 RX ORDER — VANCOMYCIN 2 GRAM/500 ML IN 0.9 % SODIUM CHLORIDE INTRAVENOUS
2000 EVERY 24 HOURS
Status: DISCONTINUED | OUTPATIENT
Start: 2021-09-11 | End: 2021-09-11

## 2021-09-11 RX ORDER — HEPARIN SODIUM 5000 [USP'U]/ML
5000 INJECTION, SOLUTION INTRAVENOUS; SUBCUTANEOUS EVERY 8 HOURS SCHEDULED
Status: DISCONTINUED | OUTPATIENT
Start: 2021-09-11 | End: 2021-09-13

## 2021-09-11 RX ORDER — TRAZODONE HYDROCHLORIDE 50 MG/1
50 TABLET ORAL NIGHTLY PRN
Status: DISCONTINUED | OUTPATIENT
Start: 2021-09-11 | End: 2021-09-13

## 2021-09-11 RX ORDER — PANTOPRAZOLE SODIUM 40 MG/1
40 TABLET, DELAYED RELEASE ORAL
Status: DISCONTINUED | OUTPATIENT
Start: 2021-09-12 | End: 2021-09-13

## 2021-09-11 RX ORDER — ATORVASTATIN CALCIUM 10 MG/1
10 TABLET, FILM COATED ORAL DAILY
Status: DISCONTINUED | OUTPATIENT
Start: 2021-09-12 | End: 2021-09-13

## 2021-09-11 RX ORDER — LEVOTHYROXINE SODIUM 0.05 MG/1
50 TABLET ORAL
Status: COMPLETED | OUTPATIENT
Start: 2021-09-12 | End: 2021-09-12

## 2021-09-11 RX ORDER — PROPRANOLOL HYDROCHLORIDE 20 MG/1
20 TABLET ORAL DAILY
Status: DISCONTINUED | OUTPATIENT
Start: 2021-09-12 | End: 2021-09-13

## 2021-09-11 RX ORDER — CEFAZOLIN SODIUM/WATER 2 G/20 ML
2 SYRINGE (ML) INTRAVENOUS EVERY 8 HOURS
Status: DISCONTINUED | OUTPATIENT
Start: 2021-09-11 | End: 2021-09-13

## 2021-09-12 LAB
ANION GAP SERPL CALC-SCNC: 5 MMOL/L (ref 0–18)
BASOPHILS # BLD AUTO: 0.02 X10(3) UL (ref 0–0.2)
BASOPHILS NFR BLD AUTO: 0.7 %
BUN BLD-MCNC: 11 MG/DL (ref 7–18)
CALCIUM BLD-MCNC: 8.7 MG/DL (ref 8.5–10.1)
CHLORIDE SERPL-SCNC: 110 MMOL/L (ref 98–112)
CO2 SERPL-SCNC: 25 MMOL/L (ref 21–32)
CREAT BLD-MCNC: 0.76 MG/DL
EOSINOPHIL # BLD AUTO: 0.11 X10(3) UL (ref 0–0.7)
EOSINOPHIL NFR BLD AUTO: 3.6 %
ERYTHROCYTE [DISTWIDTH] IN BLOOD BY AUTOMATED COUNT: 15.7 %
GLUCOSE BLD-MCNC: 136 MG/DL (ref 70–99)
GLUCOSE BLD-MCNC: 145 MG/DL (ref 70–99)
GLUCOSE BLD-MCNC: 183 MG/DL (ref 70–99)
GLUCOSE BLD-MCNC: 216 MG/DL (ref 70–99)
GLUCOSE BLD-MCNC: 61 MG/DL (ref 70–99)
GLUCOSE BLD-MCNC: 89 MG/DL (ref 70–99)
HCT VFR BLD AUTO: 31.8 %
HGB BLD-MCNC: 10.3 G/DL
IMM GRANULOCYTES # BLD AUTO: 0.01 X10(3) UL (ref 0–1)
IMM GRANULOCYTES NFR BLD: 0.3 %
LYMPHOCYTES # BLD AUTO: 0.76 X10(3) UL (ref 1–4)
LYMPHOCYTES NFR BLD AUTO: 24.9 %
MCH RBC QN AUTO: 32.1 PG (ref 26–34)
MCHC RBC AUTO-ENTMCNC: 32.4 G/DL (ref 31–37)
MCV RBC AUTO: 99.1 FL
MONOCYTES # BLD AUTO: 0.58 X10(3) UL (ref 0.1–1)
MONOCYTES NFR BLD AUTO: 19 %
NEUTROPHILS # BLD AUTO: 1.57 X10 (3) UL (ref 1.5–7.7)
NEUTROPHILS # BLD AUTO: 1.57 X10(3) UL (ref 1.5–7.7)
NEUTROPHILS NFR BLD AUTO: 51.5 %
OSMOLALITY SERPL CALC.SUM OF ELEC: 294 MOSM/KG (ref 275–295)
PLATELET # BLD AUTO: 72 10(3)UL (ref 150–450)
POTASSIUM SERPL-SCNC: 3.5 MMOL/L (ref 3.5–5.1)
RBC # BLD AUTO: 3.21 X10(6)UL
SODIUM SERPL-SCNC: 140 MMOL/L (ref 136–145)
WBC # BLD AUTO: 3.1 X10(3) UL (ref 4–11)

## 2021-09-12 PROCEDURE — 96372 THER/PROPH/DIAG INJ SC/IM: CPT

## 2021-09-12 PROCEDURE — 87081 CULTURE SCREEN ONLY: CPT | Performed by: HOSPITALIST

## 2021-09-12 PROCEDURE — 82962 GLUCOSE BLOOD TEST: CPT

## 2021-09-12 PROCEDURE — 96376 TX/PRO/DX INJ SAME DRUG ADON: CPT

## 2021-09-12 PROCEDURE — 80048 BASIC METABOLIC PNL TOTAL CA: CPT | Performed by: HOSPITALIST

## 2021-09-12 PROCEDURE — 85025 COMPLETE CBC W/AUTO DIFF WBC: CPT | Performed by: HOSPITALIST

## 2021-09-12 RX ORDER — MELATONIN
3 NIGHTLY PRN
Status: DISCONTINUED | OUTPATIENT
Start: 2021-09-12 | End: 2021-09-13

## 2021-09-12 NOTE — H&P
[facility]    History and Physical     Margareth Aiken Patient Status:  Observation    1960 MRN [id number]   [facility] 5NW-A Attending Nadir Stuart MD   Hosp Day # 0 PCP Antoni Antunez MD     Chief Complaint: cellu Past Surgical History:   Procedure Laterality Date   •   8020,5554    x 2   • COLONOSCOPY  2017    diverticulosis   • EGD  2017    Grade 1 esophageal varices, portal hypertensive gastropathy, SB Bx taken   • LAPAROSCOPY PROCEDURE UNL UNIT/ML Subcutaneous Solution Pen-injector, Inject 60 Units into the skin daily. , Disp: 60 mL, Rfl: 0  metFORMIN HCl 500 MG Oral Tab, TAKE 1 TABLET BY MOUTH THREE TIMES DAILY, Disp: 270 tablet, Rfl: 1  Continuous Blood Gluc Sensor (FREESTYLE CANDIE 14 DAY S 2  Terbinafine HCl 1 % External Solution, Apply daily to affected area daily for 2-4 weeks, Disp: 125 mL, Rfl: 2  ondansetron 4 MG Oral Tablet Dispersible, Take 1 tablet (4 mg total) by mouth every 8 (eight) hours as needed for Nausea., Disp: 20 tablet, Rf results for input(s): TROP, CK in the last 168 hours. Imaging: Imaging data reviewed in Epic. ASSESSMENT / PLAN:     Rei Caldwell is a 62year old female who presents with refractory LLE cellulitis.      Problem List / Diagnoses    Refractory Ce

## 2021-09-12 NOTE — CONSULTS
INFECTIOUS DISEASE CONSULT NOTE    Paola Benitez Patient Status:  Observation    1960 MRN [id number]   [facility] 5NW-A Attending Aleshia Ontiveros MD   Hosp Day # 0 PCP Harpreet Dillon refractory to iron therapy 6/28/2019   • Iron deficiency anemia refractory to iron therapy 1/7/2021   • Obesity, unspecified    • Patient has active power of  for health care     scanned in media tab   • Portal hypertensive gastropathy ([facility] 08/201 mg, 81 mg, Oral, Daily  •  atorvastatin (LIPITOR) tab 10 mg, 10 mg, Oral, Daily  •  furosemide (LASIX) tab 40 mg, 40 mg, Oral, Daily  •  [START ON 9/13/2021] levothyroxine tab 75 mcg, 75 mcg, Oral, Once per day on Mon Tue Wed Thu Fri  •  [facility] acute [facility]. Alert and oriented x 3. Vital signs: Blood pressure 124/60, pulse 73, temperature 98.6 °F (37 °C), temperature source Oral, resp.  rate 16, height 5' 4\" (1.626 m), weight 165 lb (74.8 kg), last menstrual period 08/09/2007, SpO2 98 %, not c of the deep venous system of the left lower extremity was performed and supplemented by Doppler and duplex interrogation with spectral analysis.  FINDINGS: The common femoral, superficial femoral and popliteal veins through the tibioperoneal trunk are paten

## 2021-09-12 NOTE — PROGRESS NOTES
[facility]    Progress Note     Barney Yao Patient Status:  Observation    1960 MRN [id number]   [facility] 5NW-A Attending Chad Cuellar MD   Hosp Day # 0 PCP Kaye Lion MD     Chief Complaint: No chief compl Units Subcutaneous Q8H [facility]   • aspirin  81 mg Oral Daily   • atorvastatin  10 mg Oral Daily   • furosemide  40 mg Oral Daily   • [START ON 9/13/2021] levothyroxine  75 mcg Oral Once per day on Mon Tue Wed Thu Fri   • montelukast  10 mg Oral Daily   • panto

## 2021-09-12 NOTE — PROGRESS NOTES
NURSING ADMISSION NOTE      Patient admitted via Wheelchair  Oriented to room. Safety precautions initiated. Bed in low position. Call light in reach. Pt AOx4. On RA above 100% spO2. NSR, cardiac WNL.  No complaints of pain, nausea, vomiting, or l

## 2021-09-12 NOTE — PROGRESS NOTES
Problem: Cellulitis     Data: Pt AOx4. On RA above 100% spO2. NSR on heparin. QID acucheck, pt had BS of 61 this AM, asymptomatic.  Treated with 4 glucose tabs and increased to 89 with repeat blood sugars done per protocol, MD notified and decreased long-ac

## 2021-09-12 NOTE — PROGRESS NOTES
PATIENT A&O X4.  DENIES PAIN/DISCOMFORT. NO COUGH, NO FEVER. LEFT LEG RED AND SWOLLEN. PATIENT INSTRUCTED TO KEEP IT ELEVATED - GOOD COMPLIANCE. EDGES OF REDENED AREA WAS MARKED. TRAZODONE OFFERED FOR SLEEP. SHE DECLINED.           BEDTIME ACCUCHECK T

## 2021-09-12 NOTE — PROGRESS NOTES
PATIENT DISCUSSED WITH DR FERNANDES. WILL DISCONTINUE VANCOMYCIN AND START PATIENT ON ANCEF 2MG IVPB Q 8 as  ORDERED.

## 2021-09-13 VITALS
BODY MASS INDEX: 28.68 KG/M2 | SYSTOLIC BLOOD PRESSURE: 107 MMHG | WEIGHT: 168 LBS | OXYGEN SATURATION: 95 % | RESPIRATION RATE: 18 BRPM | TEMPERATURE: 98 F | HEIGHT: 64 IN | HEART RATE: 77 BPM | DIASTOLIC BLOOD PRESSURE: 51 MMHG

## 2021-09-13 LAB
ANION GAP SERPL CALC-SCNC: 8 MMOL/L (ref 0–18)
BASOPHILS # BLD AUTO: 0.01 X10(3) UL (ref 0–0.2)
BASOPHILS NFR BLD AUTO: 0.4 %
BUN BLD-MCNC: 11 MG/DL (ref 7–18)
CALCIUM BLD-MCNC: 8.7 MG/DL (ref 8.5–10.1)
CHLORIDE SERPL-SCNC: 113 MMOL/L (ref 98–112)
CO2 SERPL-SCNC: 22 MMOL/L (ref 21–32)
CREAT BLD-MCNC: 0.7 MG/DL
EOSINOPHIL # BLD AUTO: 0.09 X10(3) UL (ref 0–0.7)
EOSINOPHIL NFR BLD AUTO: 3.4 %
ERYTHROCYTE [DISTWIDTH] IN BLOOD BY AUTOMATED COUNT: 15.8 %
EST. AVERAGE GLUCOSE BLD GHB EST-MCNC: 143 MG/DL
GLUCOSE BLD-MCNC: 204 MG/DL (ref 70–99)
GLUCOSE BLD-MCNC: 74 MG/DL (ref 70–99)
GLUCOSE BLD-MCNC: 75 MG/DL (ref 70–99)
GLUCOSE BLD-MCNC: 85 MG/DL (ref 70–99)
HBA1C: 6.6 % (ref 4–5.6)
HCT VFR BLD AUTO: 30.5 %
HGB BLD-MCNC: 9.5 G/DL
IMM GRANULOCYTES # BLD AUTO: 0 X10(3) UL (ref 0–1)
IMM GRANULOCYTES NFR BLD: 0 %
LYMPHOCYTES # BLD AUTO: 0.73 X10(3) UL (ref 1–4)
LYMPHOCYTES NFR BLD AUTO: 27.9 %
MCH RBC QN AUTO: 30.6 PG (ref 26–34)
MCHC RBC AUTO-ENTMCNC: 31.1 G/DL (ref 31–37)
MCV RBC AUTO: 98.4 FL
MONOCYTES # BLD AUTO: 0.46 X10(3) UL (ref 0.1–1)
MONOCYTES NFR BLD AUTO: 17.6 %
NEUTROPHILS # BLD AUTO: 1.33 X10 (3) UL (ref 1.5–7.7)
NEUTROPHILS # BLD AUTO: 1.33 X10(3) UL (ref 1.5–7.7)
NEUTROPHILS NFR BLD AUTO: 50.7 %
OSMOLALITY SERPL CALC.SUM OF ELEC: 294 MOSM/KG (ref 275–295)
PLATELET # BLD AUTO: 57 10(3)UL (ref 150–450)
POTASSIUM SERPL-SCNC: 3.5 MMOL/L (ref 3.5–5.1)
RBC # BLD AUTO: 3.1 X10(6)UL
SODIUM SERPL-SCNC: 143 MMOL/L (ref 136–145)
WBC # BLD AUTO: 2.6 X10(3) UL (ref 4–11)

## 2021-09-13 PROCEDURE — 80048 BASIC METABOLIC PNL TOTAL CA: CPT | Performed by: HOSPITALIST

## 2021-09-13 PROCEDURE — 96372 THER/PROPH/DIAG INJ SC/IM: CPT

## 2021-09-13 PROCEDURE — 85025 COMPLETE CBC W/AUTO DIFF WBC: CPT | Performed by: HOSPITALIST

## 2021-09-13 PROCEDURE — 96376 TX/PRO/DX INJ SAME DRUG ADON: CPT

## 2021-09-13 PROCEDURE — 82962 GLUCOSE BLOOD TEST: CPT

## 2021-09-13 RX ORDER — PROPRANOLOL HYDROCHLORIDE 20 MG/1
20 TABLET ORAL DAILY
Qty: 90 TABLET | Refills: 3 | Status: SHIPPED | COMMUNITY
Start: 2021-09-13 | End: 2021-11-18

## 2021-09-13 RX ORDER — CEFADROXIL 500 MG/1
500 CAPSULE ORAL 2 TIMES DAILY
Qty: 28 CAPSULE | Refills: 0 | Status: SHIPPED
Start: 2021-09-13 | End: 2021-09-27

## 2021-09-13 NOTE — PLAN OF CARE
Problem: Patient/Family Goals  Goal: Patient/Family Long Term Goal  Description: Patient's Long Term Goal:   \"to get the infection taken cared of\"    Interventions:  -ID CONSULT  -IV ANTIBX as ORDERED  - ELEVATE L LEG  - CARRY OUT PLAN OF CARE    - See Progressing     Problem: RISK FOR INFECTION - ADULT  Goal: Absence of fever/infection during anticipated neutropenic period  Description: INTERVENTIONS  - Monitor WBC  - Administer growth factors as ordered  - Implement neutropenic guidelines  Outcome: Pro

## 2021-09-13 NOTE — PROGRESS NOTES
NURSING DISCHARGE NOTE    Discharged Home via Wheelchair. Accompanied by Support staff  Belongings Taken by patient/family. Patient discharged in stable condition. PIV removed. Pt drove herself, states she feels comfortable driving herself home.

## 2021-09-13 NOTE — PROGRESS NOTES
INFECTIOUS DISEASE PROGRESS NOTE    Radha Laws Patient Status:  Observation    1960 MRN [id number]   [facility] 5NW-A Attending Liu Gallegos MD   Hosp Day # 0 PCP Roxann Schmidt Subcutaneous, TID CC and HS  •  ceFAZolin sodium (ANCEF/KEFZOL) 2 GM/20ML premix IV syringe 2 g, 2 g, Intravenous, Q8H    Review of Systems:    Completed. See pertinent positives and negatives as above    Physical Exam:    General: No acute distress.  Alert DIAG IMG (TMT=18499)    Result Date: 9/11/2021  DATE OF SERVICE: 09.11.2021 US VENOUS DOPPLER LEG LEFT - DIAG IMG (CPT=93971) CLINICAL INDICATION:  Cellulitis of left lower extremity. COMPARISON:  September 6, 2018.  TECHNIQUE:  Real-time gray scale ultraso

## 2021-09-13 NOTE — PROGRESS NOTES
REPORTED THAT HER GLUCOSE MONITOR READ 61 THIS MORNING BUT  SHE DENIES HAVING SYMPTOMS OF HYPOGLYCEMIA. ACCUCHECK = 85.

## 2021-09-13 NOTE — PROGRESS NOTES
PATIENT A&OX4. DENIES PAIN/DISCOMFORT. REPORTS HER LEFT LEG \"IS MUCH IMPROVED NOW\". SWELLING AND REDNESS HAD GONE DOWN. THERE ARE WRINKLES ON HER FOOT AND CALF NOW. REPORTS SHE IS HAPPY WITH THIS PROGRESS.   HOPING TO GO HOME IN AM.  REMINDED TO

## 2021-10-11 PROBLEM — I10 ESSENTIAL HYPERTENSION: Status: ACTIVE | Noted: 2021-10-11

## 2021-10-13 ENCOUNTER — HOSPITAL ENCOUNTER (EMERGENCY)
Facility: HOSPITAL | Age: 61
Discharge: HOME | End: 2021-10-14
Attending: EMERGENCY MEDICINE
Payer: COMMERCIAL

## 2021-10-13 DIAGNOSIS — R10.13 ABDOMINAL PAIN, EPIGASTRIC: Primary | ICD-10-CM

## 2021-10-13 PROCEDURE — 96361 HYDRATE IV INFUSION ADD-ON: CPT | Performed by: EMERGENCY MEDICINE

## 2021-10-13 PROCEDURE — 93010 ELECTROCARDIOGRAM REPORT: CPT | Performed by: EMERGENCY MEDICINE

## 2021-10-13 PROCEDURE — 96375 TX/PRO/DX INJ NEW DRUG ADDON: CPT | Performed by: EMERGENCY MEDICINE

## 2021-10-13 PROCEDURE — 80053 COMPREHEN METABOLIC PANEL: CPT | Performed by: EMERGENCY MEDICINE

## 2021-10-13 PROCEDURE — 96372 THER/PROPH/DIAG INJ SC/IM: CPT | Performed by: EMERGENCY MEDICINE

## 2021-10-13 PROCEDURE — 93005 ELECTROCARDIOGRAM TRACING: CPT

## 2021-10-13 PROCEDURE — 96374 THER/PROPH/DIAG INJ IV PUSH: CPT | Performed by: EMERGENCY MEDICINE

## 2021-10-13 PROCEDURE — 99284 EMERGENCY DEPT VISIT MOD MDM: CPT | Performed by: EMERGENCY MEDICINE

## 2021-10-13 PROCEDURE — 83690 ASSAY OF LIPASE: CPT | Performed by: EMERGENCY MEDICINE

## 2021-10-13 PROCEDURE — 85025 COMPLETE CBC W/AUTO DIFF WBC: CPT | Performed by: EMERGENCY MEDICINE

## 2021-10-14 ENCOUNTER — APPOINTMENT (OUTPATIENT)
Dept: CT IMAGING | Facility: HOSPITAL | Age: 61
End: 2021-10-14
Attending: EMERGENCY MEDICINE
Payer: COMMERCIAL

## 2021-10-14 VITALS
BODY MASS INDEX: 28.34 KG/M2 | DIASTOLIC BLOOD PRESSURE: 76 MMHG | SYSTOLIC BLOOD PRESSURE: 134 MMHG | RESPIRATION RATE: 21 BRPM | HEART RATE: 64 BPM | HEIGHT: 64 IN | WEIGHT: 166 LBS | OXYGEN SATURATION: 100 % | TEMPERATURE: 97 F

## 2021-10-14 PROCEDURE — 74177 CT ABD & PELVIS W/CONTRAST: CPT | Performed by: EMERGENCY MEDICINE

## 2021-10-14 PROCEDURE — S0028 INJECTION, FAMOTIDINE, 20 MG: HCPCS | Performed by: EMERGENCY MEDICINE

## 2021-10-14 RX ORDER — DICYCLOMINE HYDROCHLORIDE 10 MG/ML
20 INJECTION INTRAMUSCULAR ONCE
Status: COMPLETED | OUTPATIENT
Start: 2021-10-14 | End: 2021-10-14

## 2021-10-14 RX ORDER — ONDANSETRON 2 MG/ML
4 INJECTION INTRAMUSCULAR; INTRAVENOUS ONCE
Status: COMPLETED | OUTPATIENT
Start: 2021-10-14 | End: 2021-10-14

## 2021-10-14 RX ORDER — FAMOTIDINE 10 MG/ML
20 INJECTION, SOLUTION INTRAVENOUS ONCE
Status: COMPLETED | OUTPATIENT
Start: 2021-10-14 | End: 2021-10-14

## 2021-10-14 NOTE — ED INITIAL ASSESSMENT (HPI)
PT c/o lightheadedness with abdominal pain and nausea starting today. Denies fevers or diarrhea.  One episode of vomiting,

## 2021-10-14 NOTE — ED PROVIDER NOTES
Patient Seen in: [facility] Emergency Department      History   Patient presents with:  Abdomen/Flank Pain  Dizziness    Stated Complaint: lightheaded, generalized abd pain starting today.      Subjective:   HPI    Patient presents with abdominal lynn gastropathy ([facility] [address] [facility] 2017   • Thyroid disease    • Visual impairment               Past Surgical History:   Procedure Laterality Date   •   0793,9825    x 2   • COLONOSCOPY  2017    diverticulosis   • EGD  2017    Grade 1 esophageal va Glucose 123 (*)     Chloride 115 (*)     Calculated Osmolality 297 (*)     AST 50 (*)     Alkaline Phosphatase 227 (*)     Albumin 2.8 (*)     A/G Ratio 0.7 (*)     All other components within normal limits   CBC W/ DIFFERENTIAL - Abnormal; Notable for the Intravenous Given 10/14/21 0025)   sodium chloride 0.9% IV bolus 500 mL (500 mL Intravenous New Bag 10/14/21 0024)   famoTIDine (PEPCID) injection 20 mg (20 mg Intravenous Given 10/14/21 0025)   Dicyclomine HCl (BENTYL) 10 MG/ML injection 20 mg (20 mg Intr

## 2021-11-02 ENCOUNTER — TELEPHONE (OUTPATIENT)
Dept: PODIATRY CLINIC | Facility: CLINIC | Age: 61
End: 2021-11-02

## 2021-11-02 NOTE — TELEPHONE ENCOUNTER
Pt states she saw Dr. Nick Bright on 6/29 and then was rx'd a topical tx for nail fungus but she never rc'd.  I informed her it was sent to pharm [facility] in [place] on [address] and see message-  E-Prescribing Status: Receipt confirmed by pharmac

## 2021-11-02 NOTE — TELEPHONE ENCOUNTER
Patient called stated that she saw Dr. Sky Castle for fungus over the summer. Dr. Marie Meneses the pt an oral prescription, that she stated she does not want, but was offered an ointment. Patient was calling for status on Ointment.

## 2021-11-15 ENCOUNTER — OFFICE VISIT (OUTPATIENT)
Dept: INTERNAL MEDICINE CLINIC | Facility: CLINIC | Age: 61
End: 2021-11-15
Payer: COMMERCIAL

## 2021-11-15 VITALS
SYSTOLIC BLOOD PRESSURE: 122 MMHG | TEMPERATURE: 98 F | RESPIRATION RATE: 18 BRPM | HEART RATE: 75 BPM | BODY MASS INDEX: 28 KG/M2 | OXYGEN SATURATION: 95 % | WEIGHT: 164 LBS | DIASTOLIC BLOOD PRESSURE: 64 MMHG | HEIGHT: 64 IN

## 2021-11-15 DIAGNOSIS — E11.9 DIABETES MELLITUS TYPE 2 WITHOUT RETINOPATHY (HCC): ICD-10-CM

## 2021-11-15 DIAGNOSIS — D50.8 OTHER IRON DEFICIENCY ANEMIA: ICD-10-CM

## 2021-11-15 DIAGNOSIS — K76.6 PORTAL HYPERTENSION (HCC): ICD-10-CM

## 2021-11-15 DIAGNOSIS — R41.0 EPISODIC CONFUSION: Primary | ICD-10-CM

## 2021-11-15 DIAGNOSIS — D69.6 THROMBOCYTOPENIA (HCC): ICD-10-CM

## 2021-11-15 PROBLEM — R73.9 HYPERGLYCEMIA: Status: RESOLVED | Noted: 2019-05-28 | Resolved: 2021-11-15

## 2021-11-15 PROBLEM — K92.2 UGI BLEED: Status: RESOLVED | Noted: 2019-05-28 | Resolved: 2021-11-15

## 2021-11-15 PROBLEM — K92.2 UPPER GI BLEEDING: Status: RESOLVED | Noted: 2019-07-08 | Resolved: 2021-11-15

## 2021-11-15 PROBLEM — K92.0 HEMATEMESIS WITH NAUSEA: Status: RESOLVED | Noted: 2019-05-28 | Resolved: 2021-11-15

## 2021-11-15 PROBLEM — D62 ANEMIA DUE TO ACUTE BLOOD LOSS: Status: RESOLVED | Noted: 2019-07-08 | Resolved: 2021-11-15

## 2021-11-15 PROBLEM — L03.116 LEFT LEG CELLULITIS: Status: RESOLVED | Noted: 2018-09-06 | Resolved: 2021-11-15

## 2021-11-15 PROBLEM — R79.89 AZOTEMIA: Status: RESOLVED | Noted: 2019-05-28 | Resolved: 2021-11-15

## 2021-11-15 PROBLEM — L03.90 CELLULITIS: Status: RESOLVED | Noted: 2021-09-11 | Resolved: 2021-11-15

## 2021-11-15 PROBLEM — E87.2 METABOLIC ACIDOSIS: Status: RESOLVED | Noted: 2019-07-08 | Resolved: 2021-11-15

## 2021-11-15 PROBLEM — E11.65 UNCONTROLLED TYPE 2 DIABETES MELLITUS WITH HYPERGLYCEMIA, WITHOUT LONG-TERM CURRENT USE OF INSULIN (HCC): Status: RESOLVED | Noted: 2019-01-11 | Resolved: 2021-11-15

## 2021-11-15 PROBLEM — E87.20 METABOLIC ACIDOSIS: Status: RESOLVED | Noted: 2019-07-08 | Resolved: 2021-11-15

## 2021-11-15 PROBLEM — I95.9 HYPOTENSION, UNSPECIFIED HYPOTENSION TYPE: Status: RESOLVED | Noted: 2019-05-28 | Resolved: 2021-11-15

## 2021-11-15 PROBLEM — E87.1 HYPONATREMIA: Status: RESOLVED | Noted: 2019-05-28 | Resolved: 2021-11-15

## 2021-11-15 PROCEDURE — 3078F DIAST BP <80 MM HG: CPT | Performed by: NURSE PRACTITIONER

## 2021-11-15 PROCEDURE — 3008F BODY MASS INDEX DOCD: CPT | Performed by: NURSE PRACTITIONER

## 2021-11-15 PROCEDURE — 99214 OFFICE O/P EST MOD 30 MIN: CPT | Performed by: NURSE PRACTITIONER

## 2021-11-15 PROCEDURE — 3074F SYST BP LT 130 MM HG: CPT | Performed by: NURSE PRACTITIONER

## 2021-11-15 NOTE — PROGRESS NOTES
HPI:    Patient ID: Ferdinand García is a 62year old female. Patient presents with:  Physical: patient experiencing intermittent abdominal pain, brain fog, unsteady gait      New to our practice.  She has been following with FM but wants to switch to IM • Insomnia, unspecified    • Iron deficiency anemia refractory to iron therapy 6/28/2019   • Iron deficiency anemia refractory to iron therapy 1/7/2021   • Obesity, unspecified    • Patient has active power of  for health care     scanned in [facility] Self-Exams: No         Current Outpatient Medications   Medication Sig Dispense Refill   • levothyroxine 75 MCG Oral Tab Take 1 tablet po daily on Monday through Friday 90 tablet 3   • Blood Glucose Monitoring Suppl (CONTOUR NEXT MONITOR) w/Device Does not (90 Base) MCG/ACT Inhalation Aero Soln Inhale 1 puff into the lungs every 6 (six) hours as needed for Wheezing. • fluticasone-salmeterol 500-50 MCG/DOSE Inhalation Aerosol Powder, Breath Activated Inhale 1 puff into the lungs as needed.        • Montelu Date    IRON 103 10/19/2021     No results found for: B12, VITB12  Lab Results   Component Value Date    PHOS 4.0 07/09/2019     Lab Results   Component Value Date    MG 2.30 01/29/2021        PHYSICAL EXAM:   /64   Pulse 75   Temp 98 °F (36.7 °C)

## 2021-12-14 ENCOUNTER — OFFICE VISIT (OUTPATIENT)
Dept: INTERNAL MEDICINE CLINIC | Facility: CLINIC | Age: 61
End: 2021-12-14
Payer: COMMERCIAL

## 2021-12-14 VITALS
DIASTOLIC BLOOD PRESSURE: 66 MMHG | TEMPERATURE: 97 F | BODY MASS INDEX: 27.89 KG/M2 | WEIGHT: 163.38 LBS | OXYGEN SATURATION: 98 % | SYSTOLIC BLOOD PRESSURE: 128 MMHG | HEIGHT: 64 IN | HEART RATE: 70 BPM | RESPIRATION RATE: 12 BRPM

## 2021-12-14 DIAGNOSIS — F51.04 PSYCHOPHYSIOLOGICAL INSOMNIA: ICD-10-CM

## 2021-12-14 DIAGNOSIS — R53.82 CHRONIC FATIGUE: Primary | ICD-10-CM

## 2021-12-14 PROCEDURE — 3074F SYST BP LT 130 MM HG: CPT | Performed by: NURSE PRACTITIONER

## 2021-12-14 PROCEDURE — 3008F BODY MASS INDEX DOCD: CPT | Performed by: NURSE PRACTITIONER

## 2021-12-14 PROCEDURE — 99213 OFFICE O/P EST LOW 20 MIN: CPT | Performed by: NURSE PRACTITIONER

## 2021-12-14 PROCEDURE — 3078F DIAST BP <80 MM HG: CPT | Performed by: NURSE PRACTITIONER

## 2021-12-14 RX ORDER — ESCITALOPRAM OXALATE 10 MG/1
10 TABLET ORAL DAILY
Qty: 90 TABLET | Refills: 0 | Status: SHIPPED | OUTPATIENT
Start: 2021-12-14 | End: 2022-01-20 | Stop reason: SINTOL

## 2021-12-14 NOTE — PROGRESS NOTES
HPI:    Patient ID: Margareth Aiken is a 62year old female. Patient presents with: Follow - Up: fatigue, still w/ on/off dizziness, gel-like stool      Did well with ovarian cyst removal surgery.  She reports 2 episodes over the last month of dizzines • Thyroid disease    • Visual impairment      Past Surgical History:   Procedure Laterality Date   •   3228,1690    x 2   • COLONOSCOPY  2017    diverticulosis   • EGD  2017    Grade 1 esophageal varices, portal hypertensive gastropa tablet 1   • propranolol 20 MG Oral Tab Take 1 tablet (20 mg total) by mouth daily. 90 tablet 1   • pantoprazole 40 MG Oral Tab EC Take 1 tablet (40 mg total) by mouth 2 (two) times daily before meals.  30 tablet 1   • metFORMIN 500 MG Oral Tab Take 1 table puff into the lungs every 6 (six) hours as needed for Wheezing. • fluticasone-salmeterol 500-50 MCG/DOSE Inhalation Aerosol Powder, Breath Activated Inhale 1 puff into the lungs as needed.        • Montelukast Sodium 10 MG Oral Tab Take 10 mg by mouth d Date    MG 2.30 01/29/2021        PHYSICAL EXAM:   /66   Pulse 70   Temp 97.1 °F (36.2 °C)   Resp 12   Ht 5' 4\" (1.626 m)   Wt 163 lb 6.4 oz (74.1 kg)   LMP 08/09/2007   SpO2 98%   BMI 28.05 kg/m²   Physical Exam  Vitals and nursing note reviewed.

## 2021-12-17 ENCOUNTER — TELEPHONE (OUTPATIENT)
Dept: INTERNAL MEDICINE CLINIC | Facility: CLINIC | Age: 61
End: 2021-12-17

## 2021-12-17 DIAGNOSIS — K58.9 IRRITABLE BOWEL SYNDROME, UNSPECIFIED TYPE: Primary | ICD-10-CM

## 2021-12-17 RX ORDER — DICYCLOMINE HYDROCHLORIDE 10 MG/1
CAPSULE ORAL
Qty: 30 CAPSULE | Refills: 2 | Status: SHIPPED | OUTPATIENT
Start: 2021-12-17

## 2021-12-17 NOTE — TELEPHONE ENCOUNTER
Per Dr. Talya Perea Bentyl 20mg TID with meals PRN. LMOVM TCOB. D/w pt above recommendation she expressed understanding. Rx sent.

## 2021-12-17 NOTE — TELEPHONE ENCOUNTER
Was given medication for ibs but recently had an episode she states that medication will take effect in 6 weeks is there anything she could do in the meantime

## 2021-12-26 PROBLEM — R19.00 PELVIC MASS IN FEMALE: Status: ACTIVE | Noted: 2021-11-04

## 2022-01-19 ENCOUNTER — APPOINTMENT (OUTPATIENT)
Dept: CT IMAGING | Facility: HOSPITAL | Age: 62
End: 2022-01-19
Attending: EMERGENCY MEDICINE
Payer: COMMERCIAL

## 2022-01-19 ENCOUNTER — HOSPITAL ENCOUNTER (OUTPATIENT)
Age: 62
Discharge: ANOTHER HOSPITAL | End: 2022-01-19
Payer: COMMERCIAL

## 2022-01-19 ENCOUNTER — HOSPITAL ENCOUNTER (EMERGENCY)
Facility: HOSPITAL | Age: 62
Discharge: HOME | End: 2022-01-19
Attending: EMERGENCY MEDICINE
Payer: COMMERCIAL

## 2022-01-19 ENCOUNTER — TELEPHONE (OUTPATIENT)
Dept: FAMILY MEDICINE CLINIC | Facility: CLINIC | Age: 62
End: 2022-01-19

## 2022-01-19 VITALS
SYSTOLIC BLOOD PRESSURE: 121 MMHG | RESPIRATION RATE: 16 BRPM | TEMPERATURE: 99 F | BODY MASS INDEX: 28.68 KG/M2 | HEIGHT: 64 IN | WEIGHT: 168 LBS | HEART RATE: 72 BPM | OXYGEN SATURATION: 99 % | DIASTOLIC BLOOD PRESSURE: 70 MMHG

## 2022-01-19 VITALS
OXYGEN SATURATION: 96 % | DIASTOLIC BLOOD PRESSURE: 59 MMHG | HEART RATE: 74 BPM | SYSTOLIC BLOOD PRESSURE: 111 MMHG | TEMPERATURE: 98 F | RESPIRATION RATE: 16 BRPM

## 2022-01-19 DIAGNOSIS — E11.9 DIABETES MELLITUS TYPE 2 WITHOUT RETINOPATHY (HCC): Primary | ICD-10-CM

## 2022-01-19 DIAGNOSIS — R19.7 DIARRHEA, UNSPECIFIED TYPE: ICD-10-CM

## 2022-01-19 DIAGNOSIS — K52.9 ACUTE COLITIS: Primary | ICD-10-CM

## 2022-01-19 DIAGNOSIS — R10.32 ABDOMINAL PAIN, LEFT LOWER QUADRANT: Primary | ICD-10-CM

## 2022-01-19 LAB
ALBUMIN SERPL-MCNC: 2.6 G/DL (ref 3.4–5)
ALBUMIN/GLOB SERPL: 0.7 {RATIO} (ref 1–2)
ALP LIVER SERPL-CCNC: 205 U/L
ALT SERPL-CCNC: 24 U/L
ANION GAP SERPL CALC-SCNC: 4 MMOL/L (ref 0–18)
AST SERPL-CCNC: 36 U/L (ref 15–37)
BASOPHILS # BLD AUTO: 0.01 X10(3) UL (ref 0–0.2)
BASOPHILS NFR BLD AUTO: 0.3 %
BILIRUB SERPL-MCNC: 1.5 MG/DL (ref 0.1–2)
BILIRUB UR QL STRIP.AUTO: NEGATIVE
BUN BLD-MCNC: 9 MG/DL (ref 7–18)
CALCIUM BLD-MCNC: 8.5 MG/DL (ref 8.5–10.1)
CHLORIDE SERPL-SCNC: 111 MMOL/L (ref 98–112)
CLARITY UR REFRACT.AUTO: CLEAR
CO2 SERPL-SCNC: 25 MMOL/L (ref 21–32)
COLOR UR AUTO: YELLOW
CREAT BLD-MCNC: 0.75 MG/DL
EOSINOPHIL # BLD AUTO: 0.1 X10(3) UL (ref 0–0.7)
EOSINOPHIL NFR BLD AUTO: 2.6 %
ERYTHROCYTE [DISTWIDTH] IN BLOOD BY AUTOMATED COUNT: 17.8 %
GLOBULIN PLAS-MCNC: 3.7 G/DL (ref 2.8–4.4)
GLUCOSE BLD-MCNC: 76 MG/DL (ref 70–99)
GLUCOSE UR STRIP.AUTO-MCNC: >=500 MG/DL
HCT VFR BLD AUTO: 30.4 %
HGB BLD-MCNC: 9.4 G/DL
IMM GRANULOCYTES # BLD AUTO: 0.01 X10(3) UL (ref 0–1)
IMM GRANULOCYTES NFR BLD: 0.3 %
KETONES UR STRIP.AUTO-MCNC: NEGATIVE MG/DL
LEUKOCYTE ESTERASE UR QL STRIP.AUTO: NEGATIVE
LIPASE SERPL-CCNC: 298 U/L (ref 73–393)
LYMPHOCYTES # BLD AUTO: 0.9 X10(3) UL (ref 1–4)
LYMPHOCYTES NFR BLD AUTO: 23.3 %
MCH RBC QN AUTO: 29.2 PG (ref 26–34)
MCHC RBC AUTO-ENTMCNC: 30.9 G/DL (ref 31–37)
MCV RBC AUTO: 94.4 FL
MONOCYTES # BLD AUTO: 0.87 X10(3) UL (ref 0.1–1)
MONOCYTES NFR BLD AUTO: 22.5 %
NEUTROPHILS # BLD AUTO: 1.97 X10 (3) UL (ref 1.5–7.7)
NEUTROPHILS # BLD AUTO: 1.97 X10(3) UL (ref 1.5–7.7)
NEUTROPHILS NFR BLD AUTO: 51 %
NITRITE UR QL STRIP.AUTO: NEGATIVE
OSMOLALITY SERPL CALC.SUM OF ELEC: 287 MOSM/KG (ref 275–295)
PH UR STRIP.AUTO: 6 [PH] (ref 5–8)
PLATELET # BLD AUTO: 86 10(3)UL (ref 150–450)
POTASSIUM SERPL-SCNC: 3.8 MMOL/L (ref 3.5–5.1)
PROT SERPL-MCNC: 6.3 G/DL (ref 6.4–8.2)
PROT UR STRIP.AUTO-MCNC: NEGATIVE MG/DL
RBC # BLD AUTO: 3.22 X10(6)UL
RBC UR QL AUTO: NEGATIVE
SARS-COV-2 RNA RESP QL NAA+PROBE: NOT DETECTED
SODIUM SERPL-SCNC: 140 MMOL/L (ref 136–145)
SP GR UR STRIP.AUTO: 1.03 (ref 1–1.03)
UROBILINOGEN UR STRIP.AUTO-MCNC: 4 MG/DL
WBC # BLD AUTO: 3.9 X10(3) UL (ref 4–11)

## 2022-01-19 PROCEDURE — 99284 EMERGENCY DEPT VISIT MOD MDM: CPT | Performed by: EMERGENCY MEDICINE

## 2022-01-19 PROCEDURE — 36415 COLL VENOUS BLD VENIPUNCTURE: CPT | Performed by: EMERGENCY MEDICINE

## 2022-01-19 PROCEDURE — 83690 ASSAY OF LIPASE: CPT | Performed by: EMERGENCY MEDICINE

## 2022-01-19 PROCEDURE — 80053 COMPREHEN METABOLIC PANEL: CPT | Performed by: EMERGENCY MEDICINE

## 2022-01-19 PROCEDURE — 99204 OFFICE O/P NEW MOD 45 MIN: CPT | Performed by: NURSE PRACTITIONER

## 2022-01-19 PROCEDURE — 81003 URINALYSIS AUTO W/O SCOPE: CPT | Performed by: EMERGENCY MEDICINE

## 2022-01-19 PROCEDURE — U0002 COVID-19 LAB TEST NON-CDC: HCPCS | Performed by: NURSE PRACTITIONER

## 2022-01-19 PROCEDURE — 74177 CT ABD & PELVIS W/CONTRAST: CPT | Performed by: EMERGENCY MEDICINE

## 2022-01-19 PROCEDURE — 85025 COMPLETE CBC W/AUTO DIFF WBC: CPT | Performed by: EMERGENCY MEDICINE

## 2022-01-19 RX ORDER — METRONIDAZOLE 500 MG/1
500 TABLET ORAL ONCE
Status: COMPLETED | OUTPATIENT
Start: 2022-01-19 | End: 2022-01-19

## 2022-01-19 RX ORDER — LEVOFLOXACIN 500 MG/1
500 TABLET, FILM COATED ORAL DAILY
Qty: 9 TABLET | Refills: 0 | Status: SHIPPED | OUTPATIENT
Start: 2022-01-19 | End: 2022-01-28

## 2022-01-19 RX ORDER — LEVOFLOXACIN 500 MG/1
500 TABLET, FILM COATED ORAL ONCE
Status: COMPLETED | OUTPATIENT
Start: 2022-01-19 | End: 2022-01-19

## 2022-01-19 RX ORDER — METRONIDAZOLE 500 MG/1
500 TABLET ORAL 3 TIMES DAILY
Qty: 30 TABLET | Refills: 0 | Status: SHIPPED | OUTPATIENT
Start: 2022-01-19 | End: 2022-01-29

## 2022-01-19 RX ORDER — EMPAGLIFLOZIN 25 MG/1
25 TABLET, FILM COATED ORAL DAILY
Refills: 0 | COMMUNITY
Start: 2022-01-19 | End: 2022-09-27

## 2022-01-19 NOTE — TELEPHONE ENCOUNTER
Spoke with pt she reports abd pain that started yesterday in the morning. Stabbing pain below naval and would move to the left side. No fever, nausea, vomiting. Not constant would come in waves. Now has subsided. Did have diarrhea x 2 this morning then pain seemed to improve. Now stools are back to normal. Pt had decreased appetite yesterday and did not eat much. Her BS this AM 46 she did eat something and just rechecked it and it is 120 now. Dre Jordan started pt on Lexapro 10mg to help aid in her IBS symptoms and this has caused weight gain and she has not been sleeping any better. Did take dicyclomine yesterday for the pain. Per [address] for evaluation and possible imaging. D/w pt above recommendations. She expressed understanding. She is aware we will contact her back tomorrow with Dr. Kingsley Alcantara recommendation regarding Lexapro. Pt sent [employer] message regarding above see documentation.

## 2022-01-19 NOTE — ED PROVIDER NOTES
Patient Seen in: [facility] [address]      History   Patient presents with:  Abdomen/Flank Pain    Stated Complaint: Having painful stomach pains.      Subjective:   HPI      CHIEF COMPLAINT:   Patient presents with:  Abdomen/Flank Pain      HP UNIT/ML Subcutaneous Solution Pen-injector Inject 54 Units into the skin daily.  (Patient taking differently: Inject 60-61 Units into the skin daily.) 60 mL 0   • levothyroxine 75 MCG Oral Tab Take 1 tablet po daily on Monday through Friday 90 tablet 3   • disease NEC     scleroderma- in remission    • Diabetes ([facility] [address] [facility] 06/2017   • [facility]    • Elevated lipids 06/2017   • Elevated liver enzymes 2014   • Encounter for insertion of mirena IUD 6/2012    per pt   • Es[facility] 08/2017 Encounter for insertion of mirena IUD 6/2012    per pt   • Esophageal varices ([facility] [address] [facility] 08/2017   • High cholesterol    • History of recent hospitalization     11/13 after urgent care stop- / paracentesis   • HYPOTHYROIDISM    • Hypothyroidism    • Insomnia, u GENERAL: well developed, well nourished, and in no apparent distress  SKIN: no rashes, no suspicious lesions  HEAD: atraumatic, normocephalic,   EYES: conjunctiva clear, EOM intact  EARS: TM's clear, no bulging, erythema or fluid bilaterally  NOSE: nostr

## 2022-01-19 NOTE — TELEPHONE ENCOUNTER
1. What are your symptoms? 12/10 abd pain low blood sugar 46   Currently a 4    2. How long have you been having these symptoms? 36 hours moved from side to side     3. Have you done anything already to treat your symptoms?          ADDITIONAL INFO:   Has IBS

## 2022-01-19 NOTE — ED INITIAL ASSESSMENT (HPI)
Pt prescribed Escitalopram for her \"bowels\" and help with sleep. C/o weight gain. Yesterday morning, developed intermittent sharp LLQ to mid lower abdominal pain. Today had diarrhea. Denies n/v or fevers.

## 2022-01-20 NOTE — ED INITIAL ASSESSMENT (HPI)
PT c/o LLQ abdominal pain starting yesterday am. PT went to IC and was told to come to ER to R/O diverticulitis. Denies hx of diverticulitis. Denies fevers, vomiting. Reports 2 episodes of diarrhea.

## 2022-01-20 NOTE — ED PROVIDER NOTES
Patient Seen in: [facility] Emergency Department      History   Patient presents with:  Abdomen/Flank Pain    Stated Complaint: abd pain     Subjective:   HPI    Patient is a 41-year-old female who presents for evaluation of left lower quadrant abdo UPPER GI ENDOSCOPY PERFORMED  5/19, 8/19    Grade 2 varices, portal gastropathy                Social History    Tobacco Use      Smoking status: Never Smoker      Smokeless tobacco: Never Used    Vaping Use      Vaping Use: Never used    Alcohol use: Not (14) - Abnormal; Notable for the following components:       Result Value    Alkaline Phosphatase 205 (*)     Total Protein 6.3 (*)     Albumin 2.6 (*)     A/G Ratio 0.7 (*)     All other components within normal limits   URINALYSIS WITH CULTURE REFLEX - A here to rule out diverticulitis. CONTRAST USED:  100cc of Omnipaque 350  FINDINGS:  LIVER:  There is cirrhotic morphology of the liver which is similar to prior exams. Findings of portal hypertension noted with recanalization of the paraumbilical vein. Meliza Hoskins MD on 1/19/2022 at 8:42 PM     Finalized by (CST): Meliza Hoskins MD on 1/19/2022 at 8:46 PM              MDM      52-year-old female with left lower quadrant pain.   Woke up with it yesterday, sort of waxes and wanes and actually is overall trending

## 2022-03-10 ENCOUNTER — TELEPHONE (OUTPATIENT)
Dept: INTERNAL MEDICINE CLINIC | Facility: CLINIC | Age: 62
End: 2022-03-10

## 2022-03-10 NOTE — TELEPHONE ENCOUNTER
Spoke with pt she reports the following symptoms:  Mucous in stool, fatigue, abdominal pain. Dx and treated for colitis in January. Symptoms resolved but now some have returned but not as severe. She does have an appt next Friday with GI. Per Dr. Henrry Maria appt for eval and then CT. D/w pt above. She expressed understanding. Appt scheduled for tomorrow.

## 2022-03-10 NOTE — TELEPHONE ENCOUNTER
Symptoms started yesterday:   Extreme fatigue, mucus in stool, overall shaky feeling     Took off work yesterday, today, and tomorrow    Seeing GI next week

## 2022-03-11 ENCOUNTER — MOBILE ENCOUNTER (OUTPATIENT)
Dept: INTERNAL MEDICINE CLINIC | Facility: CLINIC | Age: 62
End: 2022-03-11

## 2022-03-11 ENCOUNTER — LAB ENCOUNTER (OUTPATIENT)
Dept: LAB | Age: 62
End: 2022-03-11
Attending: NURSE PRACTITIONER
Payer: COMMERCIAL

## 2022-03-11 ENCOUNTER — OFFICE VISIT (OUTPATIENT)
Dept: INTERNAL MEDICINE CLINIC | Facility: CLINIC | Age: 62
End: 2022-03-11
Payer: COMMERCIAL

## 2022-03-11 ENCOUNTER — HOSPITAL ENCOUNTER (OUTPATIENT)
Dept: CT IMAGING | Age: 62
Discharge: HOME | End: 2022-03-11
Attending: NURSE PRACTITIONER
Payer: COMMERCIAL

## 2022-03-11 ENCOUNTER — TELEPHONE (OUTPATIENT)
Dept: INTERNAL MEDICINE CLINIC | Facility: CLINIC | Age: 62
End: 2022-03-11

## 2022-03-11 VITALS
WEIGHT: 156 LBS | HEIGHT: 64 IN | OXYGEN SATURATION: 98 % | SYSTOLIC BLOOD PRESSURE: 128 MMHG | TEMPERATURE: 98 F | BODY MASS INDEX: 26.63 KG/M2 | RESPIRATION RATE: 12 BRPM | DIASTOLIC BLOOD PRESSURE: 66 MMHG | HEART RATE: 72 BPM

## 2022-03-11 DIAGNOSIS — K21.9 GASTROESOPHAGEAL REFLUX DISEASE, UNSPECIFIED WHETHER ESOPHAGITIS PRESENT: ICD-10-CM

## 2022-03-11 DIAGNOSIS — Z00.00 LABORATORY EXAMINATION ORDERED AS PART OF A ROUTINE GENERAL MEDICAL EXAMINATION: ICD-10-CM

## 2022-03-11 DIAGNOSIS — R10.84 GENERALIZED ABDOMINAL PAIN: Primary | ICD-10-CM

## 2022-03-11 DIAGNOSIS — E11.9 DIABETES MELLITUS TYPE 2 WITHOUT RETINOPATHY (HCC): ICD-10-CM

## 2022-03-11 DIAGNOSIS — K52.9 COLITIS: ICD-10-CM

## 2022-03-11 DIAGNOSIS — R10.84 GENERALIZED ABDOMINAL PAIN: ICD-10-CM

## 2022-03-11 LAB
ALBUMIN SERPL-MCNC: 3.1 G/DL (ref 3.4–5)
ALBUMIN/GLOB SERPL: 0.9 {RATIO} (ref 1–2)
ALP LIVER SERPL-CCNC: 173 U/L
ALT SERPL-CCNC: 40 U/L
ANION GAP SERPL CALC-SCNC: 5 MMOL/L (ref 0–18)
AST SERPL-CCNC: 48 U/L (ref 15–37)
BASOPHILS # BLD AUTO: 0.03 X10(3) UL (ref 0–0.2)
BASOPHILS NFR BLD AUTO: 0.6 %
BILIRUB SERPL-MCNC: 2.2 MG/DL (ref 0.1–2)
BUN BLD-MCNC: 14 MG/DL (ref 7–18)
CALCIUM BLD-MCNC: 9.3 MG/DL (ref 8.5–10.1)
CHLORIDE SERPL-SCNC: 115 MMOL/L (ref 98–112)
CHOLEST SERPL-MCNC: 127 MG/DL (ref ?–200)
CO2 SERPL-SCNC: 23 MMOL/L (ref 21–32)
CREAT BLD-MCNC: 0.86 MG/DL
EOSINOPHIL # BLD AUTO: 0.1 X10(3) UL (ref 0–0.7)
EOSINOPHIL NFR BLD AUTO: 2.1 %
ERYTHROCYTE [DISTWIDTH] IN BLOOD BY AUTOMATED COUNT: 19.9 %
EST. AVERAGE GLUCOSE BLD GHB EST-MCNC: 143 MG/DL (ref 68–126)
FASTING PATIENT LIPID ANSWER: YES
FASTING STATUS PATIENT QL REPORTED: YES
GLOBULIN PLAS-MCNC: 3.6 G/DL (ref 2.8–4.4)
GLUCOSE BLD-MCNC: 69 MG/DL (ref 70–99)
HBA1C MFR BLD: 6.6 % (ref ?–5.7)
HCT VFR BLD AUTO: 36 %
HDLC SERPL-MCNC: 49 MG/DL (ref 40–59)
HGB BLD-MCNC: 11.6 G/DL
IMM GRANULOCYTES # BLD AUTO: 0.01 X10(3) UL (ref 0–1)
IMM GRANULOCYTES NFR BLD: 0.2 %
LDLC SERPL CALC-MCNC: 61 MG/DL (ref ?–100)
LYMPHOCYTES # BLD AUTO: 1.25 X10(3) UL (ref 1–4)
LYMPHOCYTES NFR BLD AUTO: 26.7 %
MCH RBC QN AUTO: 29.5 PG (ref 26–34)
MCHC RBC AUTO-ENTMCNC: 32.2 G/DL (ref 31–37)
MCV RBC AUTO: 91.6 FL
MONOCYTES # BLD AUTO: 0.87 X10(3) UL (ref 0.1–1)
MONOCYTES NFR BLD AUTO: 18.6 %
NEUTROPHILS # BLD AUTO: 2.42 X10 (3) UL (ref 1.5–7.7)
NEUTROPHILS # BLD AUTO: 2.42 X10(3) UL (ref 1.5–7.7)
NEUTROPHILS NFR BLD AUTO: 51.8 %
NONHDLC SERPL-MCNC: 78 MG/DL (ref ?–130)
OSMOLALITY SERPL CALC.SUM OF ELEC: 295 MOSM/KG (ref 275–295)
PLATELET # BLD AUTO: 79 10(3)UL (ref 150–450)
POTASSIUM SERPL-SCNC: 4 MMOL/L (ref 3.5–5.1)
PROT SERPL-MCNC: 6.7 G/DL (ref 6.4–8.2)
RBC # BLD AUTO: 3.93 X10(6)UL
SODIUM SERPL-SCNC: 143 MMOL/L (ref 136–145)
TRIGL SERPL-MCNC: 88 MG/DL (ref 30–149)
TSI SER-ACNC: 1.15 MIU/ML (ref 0.36–3.74)
VLDLC SERPL CALC-MCNC: 13 MG/DL (ref 0–30)
WBC # BLD AUTO: 4.7 X10(3) UL (ref 4–11)

## 2022-03-11 PROCEDURE — 3074F SYST BP LT 130 MM HG: CPT | Performed by: NURSE PRACTITIONER

## 2022-03-11 PROCEDURE — 3078F DIAST BP <80 MM HG: CPT | Performed by: NURSE PRACTITIONER

## 2022-03-11 PROCEDURE — 3008F BODY MASS INDEX DOCD: CPT | Performed by: NURSE PRACTITIONER

## 2022-03-11 PROCEDURE — 83036 HEMOGLOBIN GLYCOSYLATED A1C: CPT | Performed by: NURSE PRACTITIONER

## 2022-03-11 PROCEDURE — 99214 OFFICE O/P EST MOD 30 MIN: CPT | Performed by: NURSE PRACTITIONER

## 2022-03-11 PROCEDURE — 74177 CT ABD & PELVIS W/CONTRAST: CPT | Performed by: NURSE PRACTITIONER

## 2022-03-11 PROCEDURE — 80061 LIPID PANEL: CPT | Performed by: NURSE PRACTITIONER

## 2022-03-11 PROCEDURE — 80050 GENERAL HEALTH PANEL: CPT | Performed by: NURSE PRACTITIONER

## 2022-03-11 RX ORDER — FUROSEMIDE 40 MG/1
TABLET ORAL
COMMUNITY
Start: 2021-12-29

## 2022-03-11 RX ORDER — DULAGLUTIDE 1.5 MG/.5ML
INJECTION, SOLUTION SUBCUTANEOUS
COMMUNITY
Start: 2022-03-02

## 2022-03-11 RX ORDER — OMEPRAZOLE 40 MG/1
40 CAPSULE, DELAYED RELEASE ORAL DAILY
Qty: 30 CAPSULE | Refills: 0 | Status: SHIPPED | OUTPATIENT
Start: 2022-03-11 | End: 2022-04-10

## 2022-03-11 RX ORDER — TIOTROPIUM BROMIDE INHALATION SPRAY 1.56 UG/1
2 SPRAY, METERED RESPIRATORY (INHALATION) DAILY
COMMUNITY
Start: 2021-12-20

## 2022-03-11 RX ORDER — IOHEXOL 350 MG/ML
100 INJECTION, SOLUTION INTRAVENOUS
Status: COMPLETED | OUTPATIENT
Start: 2022-03-11 | End: 2022-03-11

## 2022-03-11 RX ADMIN — IOHEXOL 100 ML: 350 INJECTION, SOLUTION INTRAVENOUS at 20:40:00

## 2022-03-11 NOTE — TELEPHONE ENCOUNTER
Cristofer from [employer] [address] called and stated ordering provider's office needs to call for authorization for CT [phone]

## 2022-03-12 ENCOUNTER — HOSPITAL ENCOUNTER (EMERGENCY)
Facility: HOSPITAL | Age: 62
Discharge: HOME | End: 2022-03-12
Attending: EMERGENCY MEDICINE
Payer: COMMERCIAL

## 2022-03-12 VITALS
BODY MASS INDEX: 27.31 KG/M2 | RESPIRATION RATE: 14 BRPM | TEMPERATURE: 98 F | SYSTOLIC BLOOD PRESSURE: 124 MMHG | OXYGEN SATURATION: 99 % | HEIGHT: 64 IN | HEART RATE: 70 BPM | WEIGHT: 160 LBS | DIASTOLIC BLOOD PRESSURE: 65 MMHG

## 2022-03-12 DIAGNOSIS — K52.9 ACUTE COLITIS: Primary | ICD-10-CM

## 2022-03-12 LAB
ALBUMIN SERPL-MCNC: 3 G/DL (ref 3.4–5)
ALBUMIN/GLOB SERPL: 0.7 {RATIO} (ref 1–2)
ALP LIVER SERPL-CCNC: 173 U/L
ALT SERPL-CCNC: 40 U/L
ANION GAP SERPL CALC-SCNC: 4 MMOL/L (ref 0–18)
AST SERPL-CCNC: 44 U/L (ref 15–37)
BASOPHILS # BLD AUTO: 0.01 X10(3) UL (ref 0–0.2)
BASOPHILS NFR BLD AUTO: 0.2 %
BILIRUB SERPL-MCNC: 2.6 MG/DL (ref 0.1–2)
BILIRUB UR QL STRIP.AUTO: NEGATIVE
BUN BLD-MCNC: 11 MG/DL (ref 7–18)
CALCIUM BLD-MCNC: 9.3 MG/DL (ref 8.5–10.1)
CHLORIDE SERPL-SCNC: 113 MMOL/L (ref 98–112)
CLARITY UR REFRACT.AUTO: CLEAR
CO2 SERPL-SCNC: 25 MMOL/L (ref 21–32)
COLOR UR AUTO: YELLOW
CREAT BLD-MCNC: 0.87 MG/DL
EOSINOPHIL # BLD AUTO: 0.09 X10(3) UL (ref 0–0.7)
EOSINOPHIL NFR BLD AUTO: 2.1 %
ERYTHROCYTE [DISTWIDTH] IN BLOOD BY AUTOMATED COUNT: 19.7 %
GLOBULIN PLAS-MCNC: 4.1 G/DL (ref 2.8–4.4)
GLUCOSE BLD-MCNC: 111 MG/DL (ref 70–99)
GLUCOSE BLD-MCNC: 113 MG/DL (ref 70–99)
GLUCOSE UR STRIP.AUTO-MCNC: >=500 MG/DL
HCT VFR BLD AUTO: 36 %
HGB BLD-MCNC: 12.1 G/DL
IMM GRANULOCYTES # BLD AUTO: 0.01 X10(3) UL (ref 0–1)
IMM GRANULOCYTES NFR BLD: 0.2 %
KETONES UR STRIP.AUTO-MCNC: NEGATIVE MG/DL
LEUKOCYTE ESTERASE UR QL STRIP.AUTO: NEGATIVE
LIPASE SERPL-CCNC: 260 U/L (ref 73–393)
LYMPHOCYTES # BLD AUTO: 1.05 X10(3) UL (ref 1–4)
LYMPHOCYTES NFR BLD AUTO: 25.1 %
MAGNESIUM SERPL-MCNC: 2.1 MG/DL (ref 1.6–2.6)
MCH RBC QN AUTO: 30 PG (ref 26–34)
MCHC RBC AUTO-ENTMCNC: 33.6 G/DL (ref 31–37)
MCV RBC AUTO: 89.1 FL
MONOCYTES # BLD AUTO: 0.79 X10(3) UL (ref 0.1–1)
MONOCYTES NFR BLD AUTO: 18.9 %
NEUTROPHILS # BLD AUTO: 2.24 X10 (3) UL (ref 1.5–7.7)
NEUTROPHILS # BLD AUTO: 2.24 X10(3) UL (ref 1.5–7.7)
NEUTROPHILS NFR BLD AUTO: 53.5 %
NITRITE UR QL STRIP.AUTO: NEGATIVE
OSMOLALITY SERPL CALC.SUM OF ELEC: 294 MOSM/KG (ref 275–295)
PH UR STRIP.AUTO: 6 [PH] (ref 5–8)
PLATELET # BLD AUTO: 63 10(3)UL (ref 150–450)
POTASSIUM SERPL-SCNC: 3.8 MMOL/L (ref 3.5–5.1)
PROT SERPL-MCNC: 7.1 G/DL (ref 6.4–8.2)
PROT UR STRIP.AUTO-MCNC: NEGATIVE MG/DL
RBC # BLD AUTO: 4.04 X10(6)UL
RBC UR QL AUTO: NEGATIVE
SARS-COV-2 RNA RESP QL NAA+PROBE: NOT DETECTED
SODIUM SERPL-SCNC: 142 MMOL/L (ref 136–145)
SP GR UR STRIP.AUTO: 1.02 (ref 1–1.03)
UROBILINOGEN UR STRIP.AUTO-MCNC: 4 MG/DL

## 2022-03-12 PROCEDURE — 96361 HYDRATE IV INFUSION ADD-ON: CPT

## 2022-03-12 PROCEDURE — 85025 COMPLETE CBC W/AUTO DIFF WBC: CPT | Performed by: EMERGENCY MEDICINE

## 2022-03-12 PROCEDURE — 83735 ASSAY OF MAGNESIUM: CPT | Performed by: EMERGENCY MEDICINE

## 2022-03-12 PROCEDURE — 99284 EMERGENCY DEPT VISIT MOD MDM: CPT

## 2022-03-12 PROCEDURE — 93005 ELECTROCARDIOGRAM TRACING: CPT

## 2022-03-12 PROCEDURE — 96360 HYDRATION IV INFUSION INIT: CPT

## 2022-03-12 PROCEDURE — 83690 ASSAY OF LIPASE: CPT | Performed by: EMERGENCY MEDICINE

## 2022-03-12 PROCEDURE — 82962 GLUCOSE BLOOD TEST: CPT

## 2022-03-12 PROCEDURE — 93010 ELECTROCARDIOGRAM REPORT: CPT

## 2022-03-12 PROCEDURE — 80053 COMPREHEN METABOLIC PANEL: CPT | Performed by: EMERGENCY MEDICINE

## 2022-03-12 PROCEDURE — 81003 URINALYSIS AUTO W/O SCOPE: CPT | Performed by: EMERGENCY MEDICINE

## 2022-03-12 RX ORDER — LEVOFLOXACIN 500 MG/1
500 TABLET, FILM COATED ORAL DAILY
Qty: 10 TABLET | Refills: 0 | Status: SHIPPED | OUTPATIENT
Start: 2022-03-12 | End: 2022-03-22

## 2022-03-12 RX ORDER — METRONIDAZOLE 500 MG/1
500 TABLET ORAL 3 TIMES DAILY
Qty: 30 TABLET | Refills: 0 | Status: SHIPPED | OUTPATIENT
Start: 2022-03-12 | End: 2022-03-22

## 2022-03-12 RX ORDER — SODIUM CHLORIDE 9 MG/ML
125 INJECTION, SOLUTION INTRAVENOUS CONTINUOUS
Status: DISCONTINUED | OUTPATIENT
Start: 2022-03-12 | End: 2022-03-12

## 2022-03-12 RX ORDER — HYDROCODONE BITARTRATE AND ACETAMINOPHEN 5; 325 MG/1; MG/1
1-2 TABLET ORAL EVERY 6 HOURS PRN
Qty: 10 TABLET | Refills: 0 | Status: SHIPPED | OUTPATIENT
Start: 2022-03-12 | End: 2022-03-17

## 2022-03-12 NOTE — ED INITIAL ASSESSMENT (HPI)
Pt reports \"colitis flare up\". Reports general low abdominal pain, shaking \"so much that I can't even do my insulin\" and feeling dizzy and very tired. Also reports abnormal glucose readings at home. CT done yesterday. No fever.

## 2022-03-12 NOTE — PROGRESS NOTES
Reviewed results of labs and CT scan with patient. She reports not taking pantoprazole. Omeprazole 40 mg ordered. She has appointment with GI next week.

## 2022-03-14 LAB
ATRIAL RATE: 73 BPM
P AXIS: 10 DEGREES
P-R INTERVAL: 182 MS
Q-T INTERVAL: 454 MS
QRS DURATION: 84 MS
QTC CALCULATION (BEZET): 500 MS
R AXIS: 55 DEGREES
T AXIS: 35 DEGREES
VENTRICULAR RATE: 73 BPM

## 2022-03-14 RX ORDER — OMEPRAZOLE 40 MG/1
CAPSULE, DELAYED RELEASE ORAL
Qty: 90 CAPSULE | Refills: 0 | OUTPATIENT
Start: 2022-03-14

## 2022-04-02 ENCOUNTER — MED REC SCAN ONLY (OUTPATIENT)
Dept: INTERNAL MEDICINE CLINIC | Facility: CLINIC | Age: 62
End: 2022-04-02

## 2022-07-15 ENCOUNTER — OFFICE VISIT (OUTPATIENT)
Dept: PODIATRY CLINIC | Facility: CLINIC | Age: 62
End: 2022-07-15
Payer: COMMERCIAL

## 2022-07-15 VITALS — DIASTOLIC BLOOD PRESSURE: 68 MMHG | SYSTOLIC BLOOD PRESSURE: 130 MMHG

## 2022-07-15 DIAGNOSIS — L03.032 PARONYCHIA OF GREAT TOE, LEFT: Primary | ICD-10-CM

## 2022-07-15 DIAGNOSIS — M34.9 SCLERODERMA (HCC): ICD-10-CM

## 2022-07-15 DIAGNOSIS — M84.374A STRESS FRACTURE, RIGHT FOOT, INITIAL ENCOUNTER FOR FRACTURE: ICD-10-CM

## 2022-07-15 DIAGNOSIS — E11.65 UNCONTROLLED TYPE 2 DIABETES MELLITUS WITH HYPERGLYCEMIA, WITHOUT LONG-TERM CURRENT USE OF INSULIN (HCC): ICD-10-CM

## 2022-07-15 PROCEDURE — 3078F DIAST BP <80 MM HG: CPT | Performed by: STUDENT IN AN ORGANIZED HEALTH CARE EDUCATION/TRAINING PROGRAM

## 2022-07-15 PROCEDURE — 3075F SYST BP GE 130 - 139MM HG: CPT | Performed by: STUDENT IN AN ORGANIZED HEALTH CARE EDUCATION/TRAINING PROGRAM

## 2022-07-15 PROCEDURE — 11730 AVULSION NAIL PLATE SIMPLE 1: CPT | Performed by: STUDENT IN AN ORGANIZED HEALTH CARE EDUCATION/TRAINING PROGRAM

## 2022-07-15 RX ORDER — CEPHALEXIN 500 MG/1
500 CAPSULE ORAL 2 TIMES DAILY
Qty: 14 CAPSULE | Refills: 0 | Status: SHIPPED | OUTPATIENT
Start: 2022-07-15

## 2022-07-15 NOTE — PROGRESS NOTES
Per Dr Dary Sanchez  to draw up 2.5ml of 1% Lidocaine and 2.5ml marcaine 0.5% for a left hallux  Ingrown Toenail Procedure.

## 2022-07-18 ENCOUNTER — TELEPHONE (OUTPATIENT)
Dept: PODIATRY CLINIC | Facility: CLINIC | Age: 62
End: 2022-07-18

## 2022-07-18 NOTE — TELEPHONE ENCOUNTER
Please complete notes and close chart for [facility] 07/15/2022, so that I may be able to submit request for MRI of foot. Also, I do not see that the patient had an xray of foot. Please advise.      Thank you,   Felix

## 2022-07-18 NOTE — PATIENT INSTRUCTIONS
-Completed soaking and aftercare as advised. -Take antibiotics as prescribed. -Follow-up in 1 week for reevaluation or sooner if other concerns arise.  -Complete MRI of right foot to evaluate for stress reaction.

## 2022-07-26 ENCOUNTER — OFFICE VISIT (OUTPATIENT)
Dept: PODIATRY CLINIC | Facility: CLINIC | Age: 62
End: 2022-07-26
Payer: COMMERCIAL

## 2022-07-26 DIAGNOSIS — L03.032 PARONYCHIA OF GREAT TOE, LEFT: Primary | ICD-10-CM

## 2022-07-26 DIAGNOSIS — M79.671 RIGHT FOOT PAIN: ICD-10-CM

## 2022-07-26 DIAGNOSIS — M34.9 SCLERODERMA (HCC): ICD-10-CM

## 2022-07-26 DIAGNOSIS — E11.65 UNCONTROLLED TYPE 2 DIABETES MELLITUS WITH HYPERGLYCEMIA, WITHOUT LONG-TERM CURRENT USE OF INSULIN (HCC): ICD-10-CM

## 2022-07-26 DIAGNOSIS — B35.1 ONYCHOMYCOSIS: ICD-10-CM

## 2022-07-26 PROCEDURE — 99213 OFFICE O/P EST LOW 20 MIN: CPT | Performed by: STUDENT IN AN ORGANIZED HEALTH CARE EDUCATION/TRAINING PROGRAM

## 2022-07-31 NOTE — PATIENT INSTRUCTIONS
- Use Kerasal to the right hallux nail. Follow-up with pain, signs of infection, or other concerns arise.

## 2022-09-25 ENCOUNTER — HOSPITAL ENCOUNTER (INPATIENT)
Facility: HOSPITAL | Age: 62
LOS: 2 days | Discharge: HOME | End: 2022-09-27
Attending: EMERGENCY MEDICINE | Admitting: INTERNAL MEDICINE
Payer: COMMERCIAL

## 2022-09-25 ENCOUNTER — APPOINTMENT (OUTPATIENT)
Dept: CT IMAGING | Facility: HOSPITAL | Age: 62
End: 2022-09-25
Attending: EMERGENCY MEDICINE
Payer: COMMERCIAL

## 2022-09-25 ENCOUNTER — APPOINTMENT (OUTPATIENT)
Dept: GENERAL RADIOLOGY | Facility: HOSPITAL | Age: 62
End: 2022-09-25
Payer: COMMERCIAL

## 2022-09-25 DIAGNOSIS — K52.9 ACUTE COLITIS: Primary | ICD-10-CM

## 2022-09-25 DIAGNOSIS — R19.7 DIARRHEA, UNSPECIFIED TYPE: ICD-10-CM

## 2022-09-25 DIAGNOSIS — D69.6 THROMBOCYTOPENIA (HCC): ICD-10-CM

## 2022-09-25 DIAGNOSIS — K70.31 ALCOHOLIC CIRRHOSIS OF LIVER WITH ASCITES (HCC): ICD-10-CM

## 2022-09-25 LAB
ALBUMIN SERPL-MCNC: 2.9 G/DL (ref 3.4–5)
ALBUMIN/GLOB SERPL: 0.7 {RATIO} (ref 1–2)
ALP LIVER SERPL-CCNC: 170 U/L
ALT SERPL-CCNC: 41 U/L
AMMONIA PLAS-MCNC: 88 UMOL/L (ref 11–32)
ANION GAP SERPL CALC-SCNC: 4 MMOL/L (ref 0–18)
AST SERPL-CCNC: 50 U/L (ref 15–37)
BASOPHILS # BLD AUTO: 0.01 X10(3) UL (ref 0–0.2)
BASOPHILS NFR BLD AUTO: 0.2 %
BILIRUB SERPL-MCNC: 2.3 MG/DL (ref 0.1–2)
BILIRUB UR QL STRIP.AUTO: NEGATIVE
BUN BLD-MCNC: 11 MG/DL (ref 7–18)
CALCIUM BLD-MCNC: 8.8 MG/DL (ref 8.5–10.1)
CHLORIDE SERPL-SCNC: 116 MMOL/L (ref 98–112)
CLARITY UR REFRACT.AUTO: CLEAR
CO2 SERPL-SCNC: 23 MMOL/L (ref 21–32)
COLOR UR AUTO: YELLOW
CREAT BLD-MCNC: 0.9 MG/DL
EOSINOPHIL # BLD AUTO: 0.17 X10(3) UL (ref 0–0.7)
EOSINOPHIL NFR BLD AUTO: 3.7 %
ERYTHROCYTE [DISTWIDTH] IN BLOOD BY AUTOMATED COUNT: 19.8 %
GFR SERPLBLD BASED ON 1.73 SQ M-ARVRAT: 73 ML/MIN/1.73M2 (ref 60–?)
GLOBULIN PLAS-MCNC: 4 G/DL (ref 2.8–4.4)
GLUCOSE BLD-MCNC: 107 MG/DL (ref 70–99)
GLUCOSE BLD-MCNC: 131 MG/DL (ref 70–99)
GLUCOSE UR STRIP.AUTO-MCNC: >=1000 MG/DL
HCT VFR BLD AUTO: 37.8 %
HGB BLD-MCNC: 12.4 G/DL
IMM GRANULOCYTES # BLD AUTO: 0.01 X10(3) UL (ref 0–1)
IMM GRANULOCYTES NFR BLD: 0.2 %
KETONES UR STRIP.AUTO-MCNC: NEGATIVE MG/DL
LEUKOCYTE ESTERASE UR QL STRIP.AUTO: NEGATIVE
LYMPHOCYTES # BLD AUTO: 0.85 X10(3) UL (ref 1–4)
LYMPHOCYTES NFR BLD AUTO: 18.4 %
MCH RBC QN AUTO: 31 PG (ref 26–34)
MCHC RBC AUTO-ENTMCNC: 32.8 G/DL (ref 31–37)
MCV RBC AUTO: 94.5 FL
MONOCYTES # BLD AUTO: 0.77 X10(3) UL (ref 0.1–1)
MONOCYTES NFR BLD AUTO: 16.6 %
NEUTROPHILS # BLD AUTO: 2.82 X10 (3) UL (ref 1.5–7.7)
NEUTROPHILS # BLD AUTO: 2.82 X10(3) UL (ref 1.5–7.7)
NEUTROPHILS NFR BLD AUTO: 60.9 %
NITRITE UR QL STRIP.AUTO: POSITIVE
OSMOLALITY SERPL CALC.SUM OF ELEC: 297 MOSM/KG (ref 275–295)
PH UR STRIP.AUTO: 6.5 [PH] (ref 5–8)
PLATELET # BLD AUTO: 71 10(3)UL (ref 150–450)
POTASSIUM SERPL-SCNC: 3.7 MMOL/L (ref 3.5–5.1)
PROT SERPL-MCNC: 6.9 G/DL (ref 6.4–8.2)
PROT UR STRIP.AUTO-MCNC: NEGATIVE MG/DL
RBC # BLD AUTO: 4 X10(6)UL
RBC UR QL AUTO: NEGATIVE
SARS-COV-2 RNA RESP QL NAA+PROBE: NOT DETECTED
SODIUM SERPL-SCNC: 143 MMOL/L (ref 136–145)
SP GR UR STRIP.AUTO: 1.01 (ref 1–1.03)
UROBILINOGEN UR STRIP.AUTO-MCNC: 2 MG/DL
WBC # BLD AUTO: 4.6 X10(3) UL (ref 4–11)

## 2022-09-25 PROCEDURE — 93010 ELECTROCARDIOGRAM REPORT: CPT

## 2022-09-25 PROCEDURE — 71045 X-RAY EXAM CHEST 1 VIEW: CPT | Performed by: EMERGENCY MEDICINE

## 2022-09-25 PROCEDURE — 80053 COMPREHEN METABOLIC PANEL: CPT | Performed by: EMERGENCY MEDICINE

## 2022-09-25 PROCEDURE — 87186 SC STD MICRODIL/AGAR DIL: CPT | Performed by: EMERGENCY MEDICINE

## 2022-09-25 PROCEDURE — 85025 COMPLETE CBC W/AUTO DIFF WBC: CPT | Performed by: EMERGENCY MEDICINE

## 2022-09-25 PROCEDURE — 87086 URINE CULTURE/COLONY COUNT: CPT | Performed by: EMERGENCY MEDICINE

## 2022-09-25 PROCEDURE — 96360 HYDRATION IV INFUSION INIT: CPT

## 2022-09-25 PROCEDURE — 74177 CT ABD & PELVIS W/CONTRAST: CPT | Performed by: EMERGENCY MEDICINE

## 2022-09-25 PROCEDURE — 81001 URINALYSIS AUTO W/SCOPE: CPT | Performed by: EMERGENCY MEDICINE

## 2022-09-25 PROCEDURE — 93005 ELECTROCARDIOGRAM TRACING: CPT

## 2022-09-25 PROCEDURE — 87088 URINE BACTERIA CULTURE: CPT | Performed by: EMERGENCY MEDICINE

## 2022-09-25 PROCEDURE — 85025 COMPLETE CBC W/AUTO DIFF WBC: CPT

## 2022-09-25 PROCEDURE — 96361 HYDRATE IV INFUSION ADD-ON: CPT

## 2022-09-25 PROCEDURE — 81015 MICROSCOPIC EXAM OF URINE: CPT | Performed by: EMERGENCY MEDICINE

## 2022-09-25 PROCEDURE — 99285 EMERGENCY DEPT VISIT HI MDM: CPT

## 2022-09-25 PROCEDURE — 82962 GLUCOSE BLOOD TEST: CPT

## 2022-09-25 PROCEDURE — 82140 ASSAY OF AMMONIA: CPT | Performed by: EMERGENCY MEDICINE

## 2022-09-25 PROCEDURE — 80053 COMPREHEN METABOLIC PANEL: CPT

## 2022-09-25 RX ORDER — SODIUM CHLORIDE 9 MG/ML
125 INJECTION, SOLUTION INTRAVENOUS CONTINUOUS
Status: DISCONTINUED | OUTPATIENT
Start: 2022-09-25 | End: 2022-09-26

## 2022-09-25 RX ORDER — SODIUM CHLORIDE 9 MG/ML
INJECTION, SOLUTION INTRAVENOUS CONTINUOUS
Status: CANCELLED | OUTPATIENT
Start: 2022-09-25 | End: 2022-09-26

## 2022-09-25 RX ORDER — ACETAMINOPHEN 325 MG/1
650 TABLET ORAL EVERY 4 HOURS PRN
Status: DISCONTINUED | OUTPATIENT
Start: 2022-09-25 | End: 2022-09-27

## 2022-09-25 RX ORDER — HYDROCODONE BITARTRATE AND ACETAMINOPHEN 5; 325 MG/1; MG/1
1 TABLET ORAL EVERY 4 HOURS PRN
Status: DISCONTINUED | OUTPATIENT
Start: 2022-09-25 | End: 2022-09-26

## 2022-09-25 RX ORDER — SODIUM PHOSPHATE, DIBASIC AND SODIUM PHOSPHATE, MONOBASIC 7; 19 G/133ML; G/133ML
1 ENEMA RECTAL ONCE AS NEEDED
Status: DISCONTINUED | OUTPATIENT
Start: 2022-09-25 | End: 2022-09-27

## 2022-09-25 RX ORDER — LACTULOSE 20 G/30ML
20 SOLUTION ORAL 3 TIMES DAILY
Status: DISCONTINUED | OUTPATIENT
Start: 2022-09-26 | End: 2022-09-26

## 2022-09-25 RX ORDER — SENNOSIDES 8.6 MG
17.2 TABLET ORAL NIGHTLY PRN
Status: DISCONTINUED | OUTPATIENT
Start: 2022-09-25 | End: 2022-09-27

## 2022-09-25 RX ORDER — SODIUM CHLORIDE 9 MG/ML
INJECTION, SOLUTION INTRAVENOUS CONTINUOUS
Status: DISCONTINUED | OUTPATIENT
Start: 2022-09-26 | End: 2022-09-26

## 2022-09-25 RX ORDER — HYDROCODONE BITARTRATE AND ACETAMINOPHEN 5; 325 MG/1; MG/1
2 TABLET ORAL EVERY 4 HOURS PRN
Status: DISCONTINUED | OUTPATIENT
Start: 2022-09-25 | End: 2022-09-26

## 2022-09-25 RX ORDER — MORPHINE SULFATE 2 MG/ML
2 INJECTION, SOLUTION INTRAMUSCULAR; INTRAVENOUS EVERY 2 HOUR PRN
Status: DISCONTINUED | OUTPATIENT
Start: 2022-09-25 | End: 2022-09-27

## 2022-09-25 RX ORDER — BISACODYL 10 MG
10 SUPPOSITORY, RECTAL RECTAL
Status: DISCONTINUED | OUTPATIENT
Start: 2022-09-25 | End: 2022-09-27

## 2022-09-25 RX ORDER — HEPARIN SODIUM 5000 [USP'U]/ML
5000 INJECTION, SOLUTION INTRAVENOUS; SUBCUTANEOUS EVERY 8 HOURS SCHEDULED
Status: DISCONTINUED | OUTPATIENT
Start: 2022-09-26 | End: 2022-09-27

## 2022-09-25 RX ORDER — POLYETHYLENE GLYCOL 3350 17 G/17G
17 POWDER, FOR SOLUTION ORAL DAILY PRN
Status: DISCONTINUED | OUTPATIENT
Start: 2022-09-25 | End: 2022-09-27

## 2022-09-25 RX ORDER — LACTULOSE 20 G/30ML
20 SOLUTION ORAL ONCE
Status: COMPLETED | OUTPATIENT
Start: 2022-09-25 | End: 2022-09-25

## 2022-09-25 RX ORDER — ONDANSETRON 2 MG/ML
4 INJECTION INTRAMUSCULAR; INTRAVENOUS EVERY 4 HOURS PRN
Status: CANCELLED | OUTPATIENT
Start: 2022-09-25 | End: 2022-09-25

## 2022-09-25 RX ORDER — MORPHINE SULFATE 2 MG/ML
1 INJECTION, SOLUTION INTRAMUSCULAR; INTRAVENOUS EVERY 2 HOUR PRN
Status: DISCONTINUED | OUTPATIENT
Start: 2022-09-25 | End: 2022-09-27

## 2022-09-25 RX ORDER — MORPHINE SULFATE 4 MG/ML
4 INJECTION, SOLUTION INTRAMUSCULAR; INTRAVENOUS EVERY 30 MIN PRN
Status: DISCONTINUED | OUTPATIENT
Start: 2022-09-25 | End: 2022-09-27

## 2022-09-25 RX ORDER — ONDANSETRON 2 MG/ML
4 INJECTION INTRAMUSCULAR; INTRAVENOUS EVERY 6 HOURS PRN
Status: DISCONTINUED | OUTPATIENT
Start: 2022-09-25 | End: 2022-09-27

## 2022-09-25 RX ORDER — MORPHINE SULFATE 4 MG/ML
4 INJECTION, SOLUTION INTRAMUSCULAR; INTRAVENOUS EVERY 2 HOUR PRN
Status: DISCONTINUED | OUTPATIENT
Start: 2022-09-25 | End: 2022-09-27

## 2022-09-25 RX ORDER — PROCHLORPERAZINE EDISYLATE 5 MG/ML
5 INJECTION INTRAMUSCULAR; INTRAVENOUS EVERY 8 HOURS PRN
Status: DISCONTINUED | OUTPATIENT
Start: 2022-09-25 | End: 2022-09-27

## 2022-09-25 RX ORDER — HYDROMORPHONE HYDROCHLORIDE 1 MG/ML
0.5 INJECTION, SOLUTION INTRAMUSCULAR; INTRAVENOUS; SUBCUTANEOUS EVERY 30 MIN PRN
Status: CANCELLED | OUTPATIENT
Start: 2022-09-25 | End: 2022-09-25

## 2022-09-25 NOTE — ED INITIAL ASSESSMENT (HPI)
PT TO THE ED WITH C/O HYPOTENSION. PT STATES SHE HAS BEEN IN BED SINCE Friday. PT STATES SHE FEELS LIGHT HEADED AND SHAKY. PT HAS CIRRHOSIS AND HAS EPISODES LIKE THIS EVERY COUPLE OF MONTHS. PT DENIES DRINKING ALCOHOL.  PT HAS HX OF HIGH AMMONIA LEVELS

## 2022-09-26 LAB
ADENOVIRUS F 40/41 PCR: NEGATIVE
ALBUMIN SERPL-MCNC: 2.3 G/DL (ref 3.4–5)
ALBUMIN/GLOB SERPL: 0.7 {RATIO} (ref 1–2)
ALP LIVER SERPL-CCNC: 144 U/L
ALT SERPL-CCNC: 36 U/L
AMMONIA PLAS-MCNC: 91 UMOL/L (ref 11–32)
ANION GAP SERPL CALC-SCNC: 3 MMOL/L (ref 0–18)
AST SERPL-CCNC: 40 U/L (ref 15–37)
ASTROVIRUS PCR: NEGATIVE
ATRIAL RATE: 67 BPM
BASOPHILS # BLD AUTO: 0.01 X10(3) UL (ref 0–0.2)
BASOPHILS NFR BLD AUTO: 0.3 %
BILIRUB SERPL-MCNC: 1.7 MG/DL (ref 0.1–2)
BUN BLD-MCNC: 12 MG/DL (ref 7–18)
C CAYETANENSIS DNA SPEC QL NAA+PROBE: NEGATIVE
C DIFF TOX B STL QL: NEGATIVE
CALCIUM BLD-MCNC: 8.5 MG/DL (ref 8.5–10.1)
CAMPY SP DNA.DIARRHEA STL QL NAA+PROBE: NEGATIVE
CHLORIDE SERPL-SCNC: 119 MMOL/L (ref 98–112)
CO2 SERPL-SCNC: 23 MMOL/L (ref 21–32)
CREAT BLD-MCNC: 0.85 MG/DL
CRYPTOSP DNA SPEC QL NAA+PROBE: NEGATIVE
EAEC PAA PLAS AGGR+AATA ST NAA+NON-PRB: NEGATIVE
EC STX1+STX2 + H7 FLIC SPEC NAA+PROBE: NEGATIVE
ENTAMOEBA HISTOLYTICA PCR: NEGATIVE
EOSINOPHIL # BLD AUTO: 0.09 X10(3) UL (ref 0–0.7)
EOSINOPHIL NFR BLD AUTO: 3 %
EPEC EAE GENE STL QL NAA+NON-PROBE: POSITIVE
ERYTHROCYTE [DISTWIDTH] IN BLOOD BY AUTOMATED COUNT: 19.9 %
EST. AVERAGE GLUCOSE BLD GHB EST-MCNC: 151 MG/DL (ref 68–126)
ETEC LTA+ST1A+ST1B TOX ST NAA+NON-PROBE: NEGATIVE
GFR SERPLBLD BASED ON 1.73 SQ M-ARVRAT: 78 ML/MIN/1.73M2 (ref 60–?)
GIARDIA LAMBLIA PCR: NEGATIVE
GLOBULIN PLAS-MCNC: 3.5 G/DL (ref 2.8–4.4)
GLUCOSE BLD-MCNC: 109 MG/DL (ref 70–99)
GLUCOSE BLD-MCNC: 129 MG/DL (ref 70–99)
GLUCOSE BLD-MCNC: 134 MG/DL (ref 70–99)
GLUCOSE BLD-MCNC: 168 MG/DL (ref 70–99)
GLUCOSE BLD-MCNC: 184 MG/DL (ref 70–99)
GLUCOSE BLD-MCNC: 184 MG/DL (ref 70–99)
GLUCOSE BLD-MCNC: 52 MG/DL (ref 70–99)
GLUCOSE BLD-MCNC: 95 MG/DL (ref 70–99)
HBA1C MFR BLD: 6.9 % (ref ?–5.7)
HCT VFR BLD AUTO: 31.7 %
HGB BLD-MCNC: 10.1 G/DL
IMM GRANULOCYTES # BLD AUTO: 0 X10(3) UL (ref 0–1)
IMM GRANULOCYTES NFR BLD: 0 %
LYMPHOCYTES # BLD AUTO: 0.81 X10(3) UL (ref 1–4)
LYMPHOCYTES NFR BLD AUTO: 26.6 %
MAGNESIUM SERPL-MCNC: 2.2 MG/DL (ref 1.6–2.6)
MCH RBC QN AUTO: 31 PG (ref 26–34)
MCHC RBC AUTO-ENTMCNC: 31.9 G/DL (ref 31–37)
MCV RBC AUTO: 97.2 FL
MONOCYTES # BLD AUTO: 0.58 X10(3) UL (ref 0.1–1)
MONOCYTES NFR BLD AUTO: 19 %
NEUTROPHILS # BLD AUTO: 1.56 X10 (3) UL (ref 1.5–7.7)
NEUTROPHILS # BLD AUTO: 1.56 X10(3) UL (ref 1.5–7.7)
NEUTROPHILS NFR BLD AUTO: 51.1 %
NOROVIRUS GI/GII PCR: NEGATIVE
OSMOLALITY SERPL CALC.SUM OF ELEC: 301 MOSM/KG (ref 275–295)
P AXIS: 7 DEGREES
P SHIGELLOIDES DNA STL QL NAA+PROBE: NEGATIVE
P-R INTERVAL: 180 MS
PLATELET # BLD AUTO: 43 10(3)UL (ref 150–450)
POTASSIUM SERPL-SCNC: 3.7 MMOL/L (ref 3.5–5.1)
POTASSIUM SERPL-SCNC: 3.7 MMOL/L (ref 3.5–5.1)
PROT SERPL-MCNC: 5.8 G/DL (ref 6.4–8.2)
Q-T INTERVAL: 502 MS
QRS DURATION: 92 MS
QTC CALCULATION (BEZET): 530 MS
R AXIS: 98 DEGREES
RBC # BLD AUTO: 3.26 X10(6)UL
ROTAVIRUS A PCR: NEGATIVE
SALMONELLA DNA SPEC QL NAA+PROBE: NEGATIVE
SAPOVIRUS PCR: NEGATIVE
SHIGELLA SP+EIEC IPAH ST NAA+NON-PROBE: NEGATIVE
SODIUM SERPL-SCNC: 145 MMOL/L (ref 136–145)
T AXIS: 30 DEGREES
V CHOLERAE DNA SPEC QL NAA+PROBE: NEGATIVE
VENTRICULAR RATE: 67 BPM
VIBRIO DNA SPEC NAA+PROBE: NEGATIVE
WBC # BLD AUTO: 3.1 X10(3) UL (ref 4–11)
YERSINIA DNA SPEC NAA+PROBE: NEGATIVE

## 2022-09-26 PROCEDURE — 87507 IADNA-DNA/RNA PROBE TQ 12-25: CPT | Performed by: EMERGENCY MEDICINE

## 2022-09-26 PROCEDURE — 85025 COMPLETE CBC W/AUTO DIFF WBC: CPT | Performed by: INTERNAL MEDICINE

## 2022-09-26 PROCEDURE — 97530 THERAPEUTIC ACTIVITIES: CPT

## 2022-09-26 PROCEDURE — 87493 C DIFF AMPLIFIED PROBE: CPT | Performed by: EMERGENCY MEDICINE

## 2022-09-26 PROCEDURE — 82140 ASSAY OF AMMONIA: CPT | Performed by: INTERNAL MEDICINE

## 2022-09-26 PROCEDURE — 84132 ASSAY OF SERUM POTASSIUM: CPT | Performed by: INTERNAL MEDICINE

## 2022-09-26 PROCEDURE — 94640 AIRWAY INHALATION TREATMENT: CPT

## 2022-09-26 PROCEDURE — 83735 ASSAY OF MAGNESIUM: CPT | Performed by: INTERNAL MEDICINE

## 2022-09-26 PROCEDURE — 80053 COMPREHEN METABOLIC PANEL: CPT | Performed by: INTERNAL MEDICINE

## 2022-09-26 PROCEDURE — 83036 HEMOGLOBIN GLYCOSYLATED A1C: CPT | Performed by: HOSPITALIST

## 2022-09-26 PROCEDURE — 82962 GLUCOSE BLOOD TEST: CPT

## 2022-09-26 PROCEDURE — 97161 PT EVAL LOW COMPLEX 20 MIN: CPT

## 2022-09-26 RX ORDER — LEVOFLOXACIN 750 MG/1
750 TABLET ORAL
Status: DISCONTINUED | OUTPATIENT
Start: 2022-09-26 | End: 2022-09-27

## 2022-09-26 RX ORDER — LEVOFLOXACIN 750 MG/1
750 TABLET ORAL
Status: DISCONTINUED | OUTPATIENT
Start: 2022-09-26 | End: 2022-09-26

## 2022-09-26 RX ORDER — NICOTINE POLACRILEX 4 MG
15 LOZENGE BUCCAL
Status: DISCONTINUED | OUTPATIENT
Start: 2022-09-26 | End: 2022-09-27

## 2022-09-26 RX ORDER — DEXTROSE MONOHYDRATE 25 G/50ML
INJECTION, SOLUTION INTRAVENOUS
Status: COMPLETED
Start: 2022-09-26 | End: 2022-09-26

## 2022-09-26 RX ORDER — NICOTINE POLACRILEX 4 MG
30 LOZENGE BUCCAL
Status: DISCONTINUED | OUTPATIENT
Start: 2022-09-26 | End: 2022-09-27

## 2022-09-26 RX ORDER — CEFUROXIME AXETIL 250 MG/1
250 TABLET ORAL 2 TIMES DAILY
Qty: 8 TABLET | Refills: 0 | Status: SHIPPED | OUTPATIENT
Start: 2022-09-27 | End: 2022-09-27

## 2022-09-26 RX ORDER — LEVOTHYROXINE SODIUM 0.07 MG/1
75 TABLET ORAL
Status: DISCONTINUED | OUTPATIENT
Start: 2022-09-26 | End: 2022-09-27

## 2022-09-26 RX ORDER — POTASSIUM CHLORIDE 20 MEQ/1
40 TABLET, EXTENDED RELEASE ORAL ONCE
Status: COMPLETED | OUTPATIENT
Start: 2022-09-26 | End: 2022-09-26

## 2022-09-26 RX ORDER — ALBUTEROL SULFATE 90 UG/1
1 AEROSOL, METERED RESPIRATORY (INHALATION) EVERY 6 HOURS PRN
Status: DISCONTINUED | OUTPATIENT
Start: 2022-09-26 | End: 2022-09-27

## 2022-09-26 RX ORDER — LACTULOSE 20 G/30ML
20 SOLUTION ORAL 3 TIMES DAILY
Qty: 2700 ML | Refills: 0 | Status: SHIPPED | OUTPATIENT
Start: 2022-09-26 | End: 2022-09-27

## 2022-09-26 RX ORDER — LACTULOSE 20 G/30ML
30 SOLUTION ORAL 4 TIMES DAILY
Status: DISCONTINUED | OUTPATIENT
Start: 2022-09-26 | End: 2022-09-27

## 2022-09-26 RX ORDER — DEXTROSE MONOHYDRATE 25 G/50ML
50 INJECTION, SOLUTION INTRAVENOUS
Status: DISCONTINUED | OUTPATIENT
Start: 2022-09-26 | End: 2022-09-27

## 2022-09-26 RX ORDER — LEVOTHYROXINE SODIUM 0.05 MG/1
50 TABLET ORAL
Status: DISCONTINUED | OUTPATIENT
Start: 2022-10-01 | End: 2022-09-27

## 2022-09-26 RX ORDER — MONTELUKAST SODIUM 10 MG/1
10 TABLET ORAL DAILY
Status: DISCONTINUED | OUTPATIENT
Start: 2022-09-26 | End: 2022-09-27

## 2022-09-26 RX ORDER — PANTOPRAZOLE SODIUM 40 MG/1
40 TABLET, DELAYED RELEASE ORAL
Status: DISCONTINUED | OUTPATIENT
Start: 2022-09-26 | End: 2022-09-27

## 2022-09-26 RX ORDER — LEVOFLOXACIN 500 MG/1
500 TABLET, FILM COATED ORAL
Status: DISCONTINUED | OUTPATIENT
Start: 2022-09-27 | End: 2022-09-26

## 2022-09-26 RX ORDER — DEXTROSE AND SODIUM CHLORIDE 5; .9 G/100ML; G/100ML
INJECTION, SOLUTION INTRAVENOUS CONTINUOUS
Status: DISCONTINUED | OUTPATIENT
Start: 2022-09-26 | End: 2022-09-26

## 2022-09-26 RX ORDER — ATORVASTATIN CALCIUM 10 MG/1
10 TABLET, FILM COATED ORAL DAILY
Status: DISCONTINUED | OUTPATIENT
Start: 2022-09-26 | End: 2022-09-27

## 2022-09-26 RX ORDER — FLUTICASONE FUROATE AND VILANTEROL 200; 25 UG/1; UG/1
1 POWDER RESPIRATORY (INHALATION) DAILY
Status: DISCONTINUED | OUTPATIENT
Start: 2022-09-26 | End: 2022-09-27

## 2022-09-26 RX ORDER — PROPRANOLOL HYDROCHLORIDE 20 MG/1
20 TABLET ORAL
Status: DISCONTINUED | OUTPATIENT
Start: 2022-09-26 | End: 2022-09-27

## 2022-09-26 NOTE — PROGRESS NOTES
Received patient at 0700. Alert and oriented x3, forgetful at times. Up self with bathroom privileges. 1800 ADA diet with QID accuchecks. NSR on tele, O2 sats WNL on RA. QID lactulose, 1700 dose held d/t frequent BMs per GI. PO abx as ordered. Heparin held per hospitalist d/t low platelets. Patient updated on plan of care.

## 2022-09-26 NOTE — CONSULTS
[facility] Pharmacy Note:  Renal Adjustment for levofloxacin [facility])    Howard Romo is a 65year old patient who has been prescribed levofloxacin (LEVAQUIN) 500 mg every 24 hrs. The estimated creatinine clearance is 60 mL/min (based on SCr of 0.85 mg/dL). The dose has been adjusted to levofloxacin (LEVAQUIN) 750 mg every 24 hrs per hospital renal dose adjustment protocol. Pharmacy will follow and adjust dose as warranted for additional renal function changes.     Thank you,    Shilpi Fatima, PharmD  9/26/2022  2:54 PM

## 2022-09-26 NOTE — PLAN OF CARE
Problem: Patient/Family Goals  Goal: Patient/Family Long Term Goal  Description: Patient's Long Term Goal:  Resolution of colitis    Interventions:  - bowel rest  - pain management  - g I consult  - carry out plan of care  - See additional Care Plan goals for specific interventions  Outcome: Progressing  Goal: Patient/Family Short Term Goal  Description: Patient's Short Term Goal:   09/26/2022 - pain management    Interventions:   - cold application  - pain meds as ordered  - bowel rest/npo    - See additional Care Plan goals for specific interventions  Outcome: Progressing     Problem: PAIN - ADULT  Goal: Verbalizes/displays adequate comfort level or patient's stated pain goal  Description: INTERVENTIONS:  - Encourage pt to monitor pain and request assistance  - Assess pain using appropriate pain scale  - Administer analgesics based on type and severity of pain and evaluate response  - Implement non-pharmacological measures as appropriate and evaluate response  - Consider cultural and social influences on pain and pain management  - Manage/alleviate anxiety  - Utilize distraction and/or relaxation techniques  - Monitor for opioid side effects  - Notify MD/LIP if interventions unsuccessful or patient reports new pain  - Anticipate increased pain with activity and pre-medicate as appropriate  Outcome: Progressing     Problem: RISK FOR INFECTION - ADULT  Goal: Absence of fever/infection during anticipated neutropenic period  Description: INTERVENTIONS  - Monitor WBC  - Administer growth factors as ordered  - Implement neutropenic guidelines  Outcome: Progressing     Problem: SAFETY ADULT - FALL  Goal: Free from fall injury  Description: INTERVENTIONS:  - Assess pt frequently for physical needs  - Identify cognitive and physical deficits and behaviors that affect risk of falls.   - [place] fall precautions as indicated by assessment.  - Educate pt/family on patient safety including physical limitations  - Instruct pt to call for assistance with activity based on assessment  - Modify environment to reduce risk of injury  - Provide assistive devices as appropriate  - Consider OT/PT consult to assist with strengthening/mobility  - Encourage toileting schedule  Outcome: Progressing

## 2022-09-26 NOTE — CM/SW NOTE
CM met with pt to complete POLST form. Discussed pt's current code status: DNAR/Select and that this code status will not follow her once she leaves the hospital because it is an inpatient order. Pt validated that current code status is based on her and her 's wishes for her, however she does not feel comfortable completing the form without her  being present. CM offered to call pt's  to have this discussion and complete the form, however pt declined. Also, she states that she does not feel \"up for\" completing this form while in the hospital.     Pt was provided with a copy of the POLST form and was given instructions for how to complete the POLST form when she feels it is appropriate. She verbalized that she understands that her wishes may not be known to other healthcare facilities she enters because she does not have this legal document completed and that she (or her family) will need to ensure that the facility knows her wishes the next time she is admitted to a healthcare facility. 1600: Notified by pt's RN that pt's spouse will provide completed POLST form from home to be placed on the chart and scanned into the EMR per medical records.     CHRISTEN Lauren, RN-BC    [id number]

## 2022-09-26 NOTE — PROGRESS NOTES
NURSING ADMISSION NOTE      Patient  received from the E.D.  via [facility] and admitted to room 526 with diagnosis of acute colitis. She is A&Ox4 but forgetful. Denies sob/chest pain saturating above 90% on room air. She currently denies nausea. Says she has a headache and  mild left lower quadrant pain. She declined Morphine and Norco - just wanted Tylenol. Ambulated to the bathroom with supervision. Voided clear yellow urine and had a medium amount of formed brown stool - sample sent to lab. Oriented to room. Safety precautions initiated. Bed in low position. Call light in reach.

## 2022-09-27 VITALS
SYSTOLIC BLOOD PRESSURE: 109 MMHG | RESPIRATION RATE: 16 BRPM | WEIGHT: 161.81 LBS | HEIGHT: 64 IN | HEART RATE: 65 BPM | DIASTOLIC BLOOD PRESSURE: 48 MMHG | OXYGEN SATURATION: 100 % | TEMPERATURE: 98 F | BODY MASS INDEX: 27.63 KG/M2

## 2022-09-27 LAB
ALBUMIN SERPL-MCNC: 2.4 G/DL (ref 3.4–5)
ALBUMIN/GLOB SERPL: 0.7 {RATIO} (ref 1–2)
ALP LIVER SERPL-CCNC: 151 U/L
ALT SERPL-CCNC: 37 U/L
AMMONIA PLAS-MCNC: 57 UMOL/L (ref 11–32)
ANION GAP SERPL CALC-SCNC: 5 MMOL/L (ref 0–18)
AST SERPL-CCNC: 49 U/L (ref 15–37)
BASOPHILS # BLD AUTO: 0.02 X10(3) UL (ref 0–0.2)
BASOPHILS NFR BLD AUTO: 0.6 %
BILIRUB SERPL-MCNC: 1.6 MG/DL (ref 0.1–2)
BUN BLD-MCNC: 9 MG/DL (ref 7–18)
CALCIUM BLD-MCNC: 8.7 MG/DL (ref 8.5–10.1)
CHLORIDE SERPL-SCNC: 116 MMOL/L (ref 98–112)
CO2 SERPL-SCNC: 22 MMOL/L (ref 21–32)
CREAT BLD-MCNC: 0.81 MG/DL
EOSINOPHIL # BLD AUTO: 0.1 X10(3) UL (ref 0–0.7)
EOSINOPHIL NFR BLD AUTO: 3 %
ERYTHROCYTE [DISTWIDTH] IN BLOOD BY AUTOMATED COUNT: 19.9 %
GFR SERPLBLD BASED ON 1.73 SQ M-ARVRAT: 83 ML/MIN/1.73M2 (ref 60–?)
GLOBULIN PLAS-MCNC: 3.6 G/DL (ref 2.8–4.4)
GLUCOSE BLD-MCNC: 146 MG/DL (ref 70–99)
GLUCOSE BLD-MCNC: 89 MG/DL (ref 70–99)
GLUCOSE BLD-MCNC: 96 MG/DL (ref 70–99)
HCT VFR BLD AUTO: 34.6 %
HGB BLD-MCNC: 11.1 G/DL
IMM GRANULOCYTES # BLD AUTO: 0 X10(3) UL (ref 0–1)
IMM GRANULOCYTES NFR BLD: 0 %
LYMPHOCYTES # BLD AUTO: 1 X10(3) UL (ref 1–4)
LYMPHOCYTES NFR BLD AUTO: 30.1 %
MCH RBC QN AUTO: 31.4 PG (ref 26–34)
MCHC RBC AUTO-ENTMCNC: 32.1 G/DL (ref 31–37)
MCV RBC AUTO: 98 FL
MONOCYTES # BLD AUTO: 0.59 X10(3) UL (ref 0.1–1)
MONOCYTES NFR BLD AUTO: 17.8 %
NEUTROPHILS # BLD AUTO: 1.61 X10 (3) UL (ref 1.5–7.7)
NEUTROPHILS # BLD AUTO: 1.61 X10(3) UL (ref 1.5–7.7)
NEUTROPHILS NFR BLD AUTO: 48.5 %
OSMOLALITY SERPL CALC.SUM OF ELEC: 294 MOSM/KG (ref 275–295)
PLATELET # BLD AUTO: 59 10(3)UL (ref 150–450)
POTASSIUM SERPL-SCNC: 3.8 MMOL/L (ref 3.5–5.1)
POTASSIUM SERPL-SCNC: 3.8 MMOL/L (ref 3.5–5.1)
PROT SERPL-MCNC: 6 G/DL (ref 6.4–8.2)
RBC # BLD AUTO: 3.53 X10(6)UL
SODIUM SERPL-SCNC: 143 MMOL/L (ref 136–145)
WBC # BLD AUTO: 3.3 X10(3) UL (ref 4–11)

## 2022-09-27 PROCEDURE — 85025 COMPLETE CBC W/AUTO DIFF WBC: CPT | Performed by: HOSPITALIST

## 2022-09-27 PROCEDURE — 80053 COMPREHEN METABOLIC PANEL: CPT | Performed by: HOSPITALIST

## 2022-09-27 PROCEDURE — 84132 ASSAY OF SERUM POTASSIUM: CPT | Performed by: HOSPITALIST

## 2022-09-27 PROCEDURE — 82140 ASSAY OF AMMONIA: CPT | Performed by: INTERNAL MEDICINE

## 2022-09-27 PROCEDURE — 82962 GLUCOSE BLOOD TEST: CPT

## 2022-09-27 RX ORDER — LACTULOSE 20 G/30ML
20 SOLUTION ORAL 3 TIMES DAILY
Qty: 2700 ML | Refills: 0 | Status: SHIPPED | OUTPATIENT
Start: 2022-09-27 | End: 2022-10-27

## 2022-09-27 RX ORDER — LEVOFLOXACIN 750 MG/1
750 TABLET ORAL DAILY
Qty: 12 TABLET | Refills: 0 | Status: SHIPPED | OUTPATIENT
Start: 2022-09-27

## 2022-09-27 RX ORDER — POTASSIUM CHLORIDE 20 MEQ/1
40 TABLET, EXTENDED RELEASE ORAL ONCE
Status: COMPLETED | OUTPATIENT
Start: 2022-09-27 | End: 2022-09-27

## 2022-09-27 NOTE — PROGRESS NOTES
NURSING DISCHARGE NOTE    Discharged Home via Ambulatory. Accompanied by RN  Belongings Taken by patient/family. Patient discharged home in stable condition. Pt's  unable to pick her up, so pt took [facility]. This RN escorted pt to [facility]. Discharge instructions, including new medications and f/u appts, reviewed with pt. Copy of POA form placed on chart. No questions or concerns verbalized at this time.

## 2022-09-27 NOTE — PLAN OF CARE
Pt AOx4. VSS,afebrile. Maintaining O2 sats >90% on RA. NSR on tele. Heparin on hold d/t plt count per MD. Up at [place], loose stools d/t meds. C/o Rodriguez, PRN med given. No c/o N/V. QID accucheck. PO abx. IV SL. Pt updated on POC. Call light within reach, safety precautions in place. No further pt needs at this time.      Problem: Patient/Family Goals  Goal: Patient/Family Long Term Goal  Description: Patient's Long Term Goal:  Resolution of colitis    Interventions:  - bowel rest  - pain management  - g I consult  - carry out plan of care  - See additional Care Plan goals for specific interventions  Outcome: Progressing  Goal: Patient/Family Short Term Goal  Description: Patient's Short Term Goal:   09/26/2022 - pain management  9/26: sleep, pain management  Interventions:   - cold application  - pain meds as ordered  - bowel rest/npo  -cluster care, sleep kit    - See additional Care Plan goals for specific interventions  Outcome: Progressing     Problem: PAIN - ADULT  Goal: Verbalizes/displays adequate comfort level or patient's stated pain goal  Description: INTERVENTIONS:  - Encourage pt to monitor pain and request assistance  - Assess pain using appropriate pain scale  - Administer analgesics based on type and severity of pain and evaluate response  - Implement non-pharmacological measures as appropriate and evaluate response  - Consider cultural and social influences on pain and pain management  - Manage/alleviate anxiety  - Utilize distraction and/or relaxation techniques  - Monitor for opioid side effects  - Notify MD/LIP if interventions unsuccessful or patient reports new pain  - Anticipate increased pain with activity and pre-medicate as appropriate  Outcome: Progressing     Problem: RISK FOR INFECTION - ADULT  Goal: Absence of fever/infection during anticipated neutropenic period  Description: INTERVENTIONS  - Monitor WBC  - Administer growth factors as ordered  - Implement neutropenic guidelines  Outcome: Progressing     Problem: SAFETY ADULT - FALL  Goal: Free from fall injury  Description: INTERVENTIONS:  - Assess pt frequently for physical needs  - Identify cognitive and physical deficits and behaviors that affect risk of falls.   - [place] fall precautions as indicated by assessment.  - Educate pt/family on patient safety including physical limitations  - Instruct pt to call for assistance with activity based on assessment  - Modify environment to reduce risk of injury  - Provide assistive devices as appropriate  - Consider OT/PT consult to assist with strengthening/mobility  - Encourage toileting schedule  Outcome: Progressing     Problem: GASTROINTESTINAL - ADULT  Goal: Minimal or absence of nausea and vomiting  Description: INTERVENTIONS:  - Maintain adequate hydration with IV or PO as ordered and tolerated  - Nasogastric tube to low intermittent suction as ordered  - Evaluate effectiveness of ordered antiemetic medications  - Provide nonpharmacologic comfort measures as appropriate  - Advance diet as tolerated, if ordered  - Obtain nutritional consult as needed  - Evaluate fluid balance  Outcome: Progressing  Goal: Maintains adequate nutritional intake (undernourished)  Description: INTERVENTIONS:  - Monitor percentage of each meal consumed  - Identify factors contributing to decreased intake, treat as appropriate  - Assist with meals as needed  - Monitor I&O, WT and lab values  - Obtain nutritional consult as needed  - Optimize oral hygiene and moisture  - Encourage food from home; allow for food preferences  - Enhance eating environment  Outcome: Progressing     Problem: METABOLIC/FLUID AND ELECTROLYTES - ADULT  Goal: Glucose maintained within prescribed range  Description: INTERVENTIONS:  - Monitor Blood Glucose as ordered  - Assess for signs and symptoms of hyperglycemia and hypoglycemia  - Administer ordered medications to maintain glucose within target range  - Assess barriers to adequate nutritional intake and initiate nutrition consult as needed  - Instruct patient on self management of diabetes  Outcome: Progressing  Goal: Electrolytes maintained within normal limits  Description: INTERVENTIONS:  - Monitor labs and rhythm and assess patient for signs and symptoms of electrolyte imbalances  - Administer electrolyte replacement as ordered  - Monitor response to electrolyte replacements, including rhythm and repeat lab results as appropriate  - Fluid restriction as ordered  - Instruct patient on fluid and nutrition restrictions as appropriate  Outcome: Progressing

## 2023-01-07 ENCOUNTER — APPOINTMENT (OUTPATIENT)
Dept: CT IMAGING | Facility: HOSPITAL | Age: 63
End: 2023-01-07
Attending: EMERGENCY MEDICINE
Payer: COMMERCIAL

## 2023-01-07 ENCOUNTER — HOSPITAL ENCOUNTER (INPATIENT)
Facility: HOSPITAL | Age: 63
LOS: 2 days | Discharge: HOME | End: 2023-01-09
Attending: EMERGENCY MEDICINE | Admitting: INTERNAL MEDICINE
Payer: COMMERCIAL

## 2023-01-07 DIAGNOSIS — K76.82 HEPATIC ENCEPHALOPATHY: Primary | ICD-10-CM

## 2023-01-07 DIAGNOSIS — R11.2 NAUSEA AND VOMITING, UNSPECIFIED VOMITING TYPE: ICD-10-CM

## 2023-01-07 LAB
ALBUMIN SERPL-MCNC: 2.6 G/DL (ref 3.4–5)
ALBUMIN/GLOB SERPL: 0.7 {RATIO} (ref 1–2)
ALP LIVER SERPL-CCNC: 148 U/L
ALT SERPL-CCNC: 42 U/L
AMMONIA PLAS-MCNC: 178 UMOL/L (ref 11–32)
ANION GAP SERPL CALC-SCNC: 4 MMOL/L (ref 0–18)
APTT PPP: 34.5 SECONDS (ref 23.3–35.6)
AST SERPL-CCNC: 66 U/L (ref 15–37)
BASOPHILS # BLD AUTO: 0.01 X10(3) UL (ref 0–0.2)
BASOPHILS NFR BLD AUTO: 0.3 %
BILIRUB SERPL-MCNC: 1.3 MG/DL (ref 0.1–2)
BILIRUB UR QL STRIP.AUTO: NEGATIVE
BUN BLD-MCNC: 14 MG/DL (ref 7–18)
CALCIUM BLD-MCNC: 8.9 MG/DL (ref 8.5–10.1)
CHLORIDE SERPL-SCNC: 114 MMOL/L (ref 98–112)
CLARITY UR REFRACT.AUTO: CLEAR
CO2 SERPL-SCNC: 24 MMOL/L (ref 21–32)
COLOR UR AUTO: YELLOW
CREAT BLD-MCNC: 0.78 MG/DL
EOSINOPHIL # BLD AUTO: 0.15 X10(3) UL (ref 0–0.7)
EOSINOPHIL NFR BLD AUTO: 5 %
ERYTHROCYTE [DISTWIDTH] IN BLOOD BY AUTOMATED COUNT: 16 %
EST. AVERAGE GLUCOSE BLD GHB EST-MCNC: 146 MG/DL (ref 68–126)
ETHANOL SERPL-MCNC: <3 MG/DL (ref ?–3)
GFR SERPLBLD BASED ON 1.73 SQ M-ARVRAT: 86 ML/MIN/1.73M2 (ref 60–?)
GLOBULIN PLAS-MCNC: 3.5 G/DL (ref 2.8–4.4)
GLUCOSE BLD-MCNC: 132 MG/DL (ref 70–99)
GLUCOSE BLD-MCNC: 83 MG/DL (ref 70–99)
GLUCOSE UR STRIP.AUTO-MCNC: >=500 MG/DL
HBA1C MFR BLD: 6.7 % (ref ?–5.7)
HCT VFR BLD AUTO: 30.8 %
HGB BLD-MCNC: 10.2 G/DL
IMM GRANULOCYTES # BLD AUTO: 0 X10(3) UL (ref 0–1)
IMM GRANULOCYTES NFR BLD: 0 %
INR BLD: 1.4 (ref 0.85–1.16)
KETONES UR STRIP.AUTO-MCNC: NEGATIVE MG/DL
LEUKOCYTE ESTERASE UR QL STRIP.AUTO: NEGATIVE
LIPASE SERPL-CCNC: 447 U/L (ref 73–393)
LYMPHOCYTES # BLD AUTO: 0.78 X10(3) UL (ref 1–4)
LYMPHOCYTES NFR BLD AUTO: 25.7 %
MCH RBC QN AUTO: 32.3 PG (ref 26–34)
MCHC RBC AUTO-ENTMCNC: 33.1 G/DL (ref 31–37)
MCV RBC AUTO: 97.5 FL
MONOCYTES # BLD AUTO: 0.63 X10(3) UL (ref 0.1–1)
MONOCYTES NFR BLD AUTO: 20.8 %
NEUTROPHILS # BLD AUTO: 1.46 X10 (3) UL (ref 1.5–7.7)
NEUTROPHILS # BLD AUTO: 1.46 X10(3) UL (ref 1.5–7.7)
NEUTROPHILS NFR BLD AUTO: 48.2 %
NITRITE UR QL STRIP.AUTO: NEGATIVE
OSMOLALITY SERPL CALC.SUM OF ELEC: 296 MOSM/KG (ref 275–295)
PH UR STRIP.AUTO: 5 [PH] (ref 5–8)
PLATELET # BLD AUTO: 54 10(3)UL (ref 150–450)
POTASSIUM SERPL-SCNC: 3.8 MMOL/L (ref 3.5–5.1)
PROT SERPL-MCNC: 6.1 G/DL (ref 6.4–8.2)
PROT UR STRIP.AUTO-MCNC: NEGATIVE MG/DL
PROTHROMBIN TIME: 17.1 SECONDS (ref 11.6–14.8)
RBC # BLD AUTO: 3.16 X10(6)UL
RBC UR QL AUTO: NEGATIVE
SARS-COV-2 RNA RESP QL NAA+PROBE: NOT DETECTED
SODIUM SERPL-SCNC: 142 MMOL/L (ref 136–145)
SP GR UR STRIP.AUTO: >1.03 (ref 1–1.03)
UROBILINOGEN UR STRIP.AUTO-MCNC: 2 MG/DL
WBC # BLD AUTO: 3 X10(3) UL (ref 4–11)

## 2023-01-07 PROCEDURE — 74177 CT ABD & PELVIS W/CONTRAST: CPT | Performed by: EMERGENCY MEDICINE

## 2023-01-07 PROCEDURE — 99223 1ST HOSP IP/OBS HIGH 75: CPT | Performed by: INTERNAL MEDICINE

## 2023-01-07 RX ORDER — PROPRANOLOL HYDROCHLORIDE 20 MG/1
20 TABLET ORAL DAILY
Status: DISCONTINUED | OUTPATIENT
Start: 2023-01-07 | End: 2023-01-09

## 2023-01-07 RX ORDER — MORPHINE SULFATE 2 MG/ML
2 INJECTION, SOLUTION INTRAMUSCULAR; INTRAVENOUS EVERY 2 HOUR PRN
Status: DISCONTINUED | OUTPATIENT
Start: 2023-01-07 | End: 2023-01-09

## 2023-01-07 RX ORDER — MONTELUKAST SODIUM 10 MG/1
10 TABLET ORAL DAILY
Status: DISCONTINUED | OUTPATIENT
Start: 2023-01-07 | End: 2023-01-09

## 2023-01-07 RX ORDER — NICOTINE POLACRILEX 4 MG
15 LOZENGE BUCCAL
Status: DISCONTINUED | OUTPATIENT
Start: 2023-01-07 | End: 2023-01-09

## 2023-01-07 RX ORDER — ONDANSETRON 2 MG/ML
INJECTION INTRAMUSCULAR; INTRAVENOUS
Status: COMPLETED
Start: 2023-01-07 | End: 2023-01-07

## 2023-01-07 RX ORDER — ATORVASTATIN CALCIUM 10 MG/1
10 TABLET, FILM COATED ORAL DAILY
Status: DISCONTINUED | OUTPATIENT
Start: 2023-01-07 | End: 2023-01-09

## 2023-01-07 RX ORDER — MORPHINE SULFATE 4 MG/ML
4 INJECTION, SOLUTION INTRAMUSCULAR; INTRAVENOUS EVERY 2 HOUR PRN
Status: DISCONTINUED | OUTPATIENT
Start: 2023-01-07 | End: 2023-01-09

## 2023-01-07 RX ORDER — FLUTICASONE FUROATE AND VILANTEROL 200; 25 UG/1; UG/1
1 POWDER RESPIRATORY (INHALATION) DAILY
Status: DISCONTINUED | OUTPATIENT
Start: 2023-01-07 | End: 2023-01-09

## 2023-01-07 RX ORDER — ONDANSETRON 2 MG/ML
4 INJECTION INTRAMUSCULAR; INTRAVENOUS ONCE
Status: COMPLETED | OUTPATIENT
Start: 2023-01-07 | End: 2023-01-07

## 2023-01-07 RX ORDER — HYDROCODONE BITARTRATE AND ACETAMINOPHEN 5; 325 MG/1; MG/1
1 TABLET ORAL EVERY 4 HOURS PRN
Status: DISCONTINUED | OUTPATIENT
Start: 2023-01-07 | End: 2023-01-09

## 2023-01-07 RX ORDER — HYDROCODONE BITARTRATE AND ACETAMINOPHEN 5; 325 MG/1; MG/1
2 TABLET ORAL EVERY 4 HOURS PRN
Status: DISCONTINUED | OUTPATIENT
Start: 2023-01-07 | End: 2023-01-09

## 2023-01-07 RX ORDER — LEVOTHYROXINE SODIUM 0.07 MG/1
75 TABLET ORAL
Status: DISCONTINUED | OUTPATIENT
Start: 2023-01-09 | End: 2023-01-07 | Stop reason: SDUPTHER

## 2023-01-07 RX ORDER — NICOTINE POLACRILEX 4 MG
30 LOZENGE BUCCAL
Status: DISCONTINUED | OUTPATIENT
Start: 2023-01-07 | End: 2023-01-09

## 2023-01-07 RX ORDER — LACTULOSE 20 G/30ML
20 SOLUTION ORAL ONCE
Status: COMPLETED | OUTPATIENT
Start: 2023-01-07 | End: 2023-01-07

## 2023-01-07 RX ORDER — LACTULOSE 10 G/15ML
20 SOLUTION ORAL 3 TIMES DAILY
Status: DISCONTINUED | OUTPATIENT
Start: 2023-01-07 | End: 2023-01-08

## 2023-01-07 RX ORDER — LEVOTHYROXINE SODIUM 0.05 MG/1
50 TABLET ORAL
Status: DISCONTINUED | OUTPATIENT
Start: 2023-01-07 | End: 2023-01-07 | Stop reason: SDUPTHER

## 2023-01-07 RX ORDER — ALBUTEROL SULFATE 90 UG/1
1 AEROSOL, METERED RESPIRATORY (INHALATION) EVERY 6 HOURS PRN
Status: DISCONTINUED | OUTPATIENT
Start: 2023-01-07 | End: 2023-01-09

## 2023-01-07 RX ORDER — ACETAMINOPHEN 325 MG/1
650 TABLET ORAL EVERY 4 HOURS PRN
Status: DISCONTINUED | OUTPATIENT
Start: 2023-01-07 | End: 2023-01-09

## 2023-01-07 RX ORDER — LEVOTHYROXINE SODIUM 0.07 MG/1
75 TABLET ORAL
Status: DISCONTINUED | OUTPATIENT
Start: 2023-01-09 | End: 2023-01-09

## 2023-01-07 RX ORDER — DEXTROSE MONOHYDRATE 25 G/50ML
50 INJECTION, SOLUTION INTRAVENOUS
Status: DISCONTINUED | OUTPATIENT
Start: 2023-01-07 | End: 2023-01-09

## 2023-01-07 RX ORDER — MORPHINE SULFATE 2 MG/ML
1 INJECTION, SOLUTION INTRAMUSCULAR; INTRAVENOUS EVERY 2 HOUR PRN
Status: DISCONTINUED | OUTPATIENT
Start: 2023-01-07 | End: 2023-01-09

## 2023-01-07 RX ORDER — ONDANSETRON 2 MG/ML
4 INJECTION INTRAMUSCULAR; INTRAVENOUS EVERY 6 HOURS PRN
Status: DISCONTINUED | OUTPATIENT
Start: 2023-01-07 | End: 2023-01-09

## 2023-01-07 RX ORDER — MORPHINE SULFATE 4 MG/ML
4 INJECTION, SOLUTION INTRAMUSCULAR; INTRAVENOUS ONCE
Status: COMPLETED | OUTPATIENT
Start: 2023-01-07 | End: 2023-01-07

## 2023-01-07 RX ORDER — SODIUM CHLORIDE 9 MG/ML
INJECTION, SOLUTION INTRAVENOUS CONTINUOUS
Status: ACTIVE | OUTPATIENT
Start: 2023-01-07 | End: 2023-01-07

## 2023-01-07 RX ORDER — LEVOTHYROXINE SODIUM 0.05 MG/1
50 TABLET ORAL
Status: DISCONTINUED | OUTPATIENT
Start: 2023-01-07 | End: 2023-01-09

## 2023-01-07 NOTE — ED QUICK NOTES
Orders for admission, patient is aware of plan and ready to go upstairs. Any questions, please call ED RN Lita Monroe  at extension [phone]. Vaccinated?  Yes  Type of COVID test sent: Rapid  COVID Suspicion level: None      Titratable drug(s) infusin.9 NaCl  Rate: bolue    LOC at time of transport: A/Ox 4    Other pertinent information: N/A    CIWA score= N/A  NIH score= N/A

## 2023-01-07 NOTE — ED INITIAL ASSESSMENT (HPI)
Patient here with c/o abdominal pain for a couple days that became worse tonight and patient began vomiting and having diarrhea.   Denies fever

## 2023-01-07 NOTE — PROGRESS NOTES
Patient arrived around 9:40am from ER.  at bedside. States she feels cold but no pain. Alert and oriented x4.  is at bedside. Will wait for admission orders.

## 2023-01-08 ENCOUNTER — APPOINTMENT (OUTPATIENT)
Dept: GENERAL RADIOLOGY | Facility: HOSPITAL | Age: 63
End: 2023-01-08
Attending: INTERNAL MEDICINE
Payer: COMMERCIAL

## 2023-01-08 LAB
ALBUMIN SERPL-MCNC: 2.6 G/DL (ref 3.4–5)
ALBUMIN/GLOB SERPL: 0.7 {RATIO} (ref 1–2)
ALP LIVER SERPL-CCNC: 151 U/L
ALT SERPL-CCNC: 41 U/L
AMMONIA PLAS-MCNC: 61 UMOL/L (ref 11–32)
ANION GAP SERPL CALC-SCNC: 5 MMOL/L (ref 0–18)
AST SERPL-CCNC: 67 U/L (ref 15–37)
BASOPHILS # BLD AUTO: 0.01 X10(3) UL (ref 0–0.2)
BASOPHILS NFR BLD AUTO: 0.2 %
BILIRUB SERPL-MCNC: 2.3 MG/DL (ref 0.1–2)
BUN BLD-MCNC: 11 MG/DL (ref 7–18)
CALCIUM BLD-MCNC: 9 MG/DL (ref 8.5–10.1)
CHLORIDE SERPL-SCNC: 113 MMOL/L (ref 98–112)
CO2 SERPL-SCNC: 25 MMOL/L (ref 21–32)
CREAT BLD-MCNC: 0.86 MG/DL
EOSINOPHIL # BLD AUTO: 0.14 X10(3) UL (ref 0–0.7)
EOSINOPHIL NFR BLD AUTO: 3.2 %
ERYTHROCYTE [DISTWIDTH] IN BLOOD BY AUTOMATED COUNT: 16.5 %
GFR SERPLBLD BASED ON 1.73 SQ M-ARVRAT: 77 ML/MIN/1.73M2 (ref 60–?)
GLOBULIN PLAS-MCNC: 3.6 G/DL (ref 2.8–4.4)
GLUCOSE BLD-MCNC: 108 MG/DL (ref 70–99)
GLUCOSE BLD-MCNC: 110 MG/DL (ref 70–99)
GLUCOSE BLD-MCNC: 160 MG/DL (ref 70–99)
GLUCOSE BLD-MCNC: 168 MG/DL (ref 70–99)
GLUCOSE BLD-MCNC: 174 MG/DL (ref 70–99)
GLUCOSE BLD-MCNC: 89 MG/DL (ref 70–99)
HCT VFR BLD AUTO: 34.3 %
HGB BLD-MCNC: 10.7 G/DL
IMM GRANULOCYTES # BLD AUTO: 0.01 X10(3) UL (ref 0–1)
IMM GRANULOCYTES NFR BLD: 0.2 %
INR BLD: 1.33 (ref 0.85–1.16)
LIPASE SERPL-CCNC: 292 U/L (ref 73–393)
LYMPHOCYTES # BLD AUTO: 1.16 X10(3) UL (ref 1–4)
LYMPHOCYTES NFR BLD AUTO: 26.8 %
MCH RBC QN AUTO: 31.9 PG (ref 26–34)
MCHC RBC AUTO-ENTMCNC: 31.2 G/DL (ref 31–37)
MCV RBC AUTO: 102.4 FL
MONOCYTES # BLD AUTO: 0.85 X10(3) UL (ref 0.1–1)
MONOCYTES NFR BLD AUTO: 19.6 %
NEUTROPHILS # BLD AUTO: 2.16 X10 (3) UL (ref 1.5–7.7)
NEUTROPHILS # BLD AUTO: 2.16 X10(3) UL (ref 1.5–7.7)
NEUTROPHILS NFR BLD AUTO: 50 %
OSMOLALITY SERPL CALC.SUM OF ELEC: 295 MOSM/KG (ref 275–295)
PLATELET # BLD AUTO: 69 10(3)UL (ref 150–450)
POTASSIUM SERPL-SCNC: 3.8 MMOL/L (ref 3.5–5.1)
PROT SERPL-MCNC: 6.2 G/DL (ref 6.4–8.2)
PROTHROMBIN TIME: 16.4 SECONDS (ref 11.6–14.8)
RBC # BLD AUTO: 3.35 X10(6)UL
SODIUM SERPL-SCNC: 143 MMOL/L (ref 136–145)
WBC # BLD AUTO: 4.3 X10(3) UL (ref 4–11)

## 2023-01-08 PROCEDURE — 71045 X-RAY EXAM CHEST 1 VIEW: CPT | Performed by: INTERNAL MEDICINE

## 2023-01-08 PROCEDURE — 99232 SBSQ HOSP IP/OBS MODERATE 35: CPT | Performed by: INTERNAL MEDICINE

## 2023-01-08 RX ORDER — LACTULOSE 10 G/15ML
30 SOLUTION ORAL 3 TIMES DAILY
Status: DISCONTINUED | OUTPATIENT
Start: 2023-01-08 | End: 2023-01-09

## 2023-01-08 NOTE — PHYSICAL THERAPY NOTE
Order received for PT evaluation. Per RN, pt is up ad bonifacio, no mobility concerns and lives with supportive spouse. No PT evaluation indicated at this time.

## 2023-01-08 NOTE — PLAN OF CARE
Assumed care at 7:30 am.  Patient is alert and oriented x2-3  She is confused at times. States she has some abdominal pain. Refused Tele. On RA  All safety precautions in place. Will continue to monitor patient. Dr. Wai Cha notified of Ammonia level and CXR. Enema given.            Problem: Diabetes/Glucose Control  Goal: Glucose maintained within prescribed range  Description: INTERVENTIONS:  - Monitor Blood Glucose as ordered  - Assess for signs and symptoms of hyperglycemia and hypoglycemia  - Administer ordered medications to maintain glucose within target range  - Assess barriers to adequate nutritional intake and initiate nutrition consult as needed  - Instruct patient on self management of diabetes  Outcome: Progressing     Problem: Patient/Family Goals  Goal: Patient/Family Long Term Goal  Description: Patient's Long Term Goal: Discharge    Interventions:  - GI, hosp consults  - PT/OT consult  - pain control  - See additional Care Plan goals for specific interventions  Outcome: Progressing  Goal: Patient/Family Short Term Goal  Description: Patient's Short Term Goal: Pain management    Interventions:   - PRN pain medications  - See additional Care Plan goals for specific interventions  Outcome: Progressing

## 2023-01-08 NOTE — OCCUPATIONAL THERAPY NOTE
Order received for OT evaluation. Per RN, pt is up ad bonifacio, no mobility concerns and lives with supportive spouse. No OT evaluation indicated at this time.

## 2023-01-08 NOTE — PLAN OF CARE
Patient is A&Ox 3, confused at times. VSS. Afebrile. Room air. Refused tele monitoring. On Carb Controlled diet. Accu- checks QID. Patient c/o severe abdominal pain with no nausea/vomiting. GI is following. PRN Morphine given per [facility], with relief. Lactulose given per MAR. Patient is up adlib with generalized weakness. PT/OT  for eval.  Right AC - saline locked. All needs being met at this time.       Problem: Diabetes/Glucose Control  Goal: Glucose maintained within prescribed range  Description: INTERVENTIONS:  - Monitor Blood Glucose as ordered  - Assess for signs and symptoms of hyperglycemia and hypoglycemia  - Administer ordered medications to maintain glucose within target range  - Assess barriers to adequate nutritional intake and initiate nutrition consult as needed  - Instruct patient on self management of diabetes  Outcome: Progressing     Problem: Patient/Family Goals  Goal: Patient/Family Long Term Goal  Description: Patient's Long Term Goal: Discharge    Interventions:  - GI, hosp consults  - PT/OT consult  - pain control  - See additional Care Plan goals for specific interventions  Outcome: Progressing  Goal: Patient/Family Short Term Goal  Description: Patient's Short Term Goal: Pain management    Interventions:   - PRN pain medications  - See additional Care Plan goals for specific interventions  Outcome: Progressing

## 2023-01-09 VITALS
BODY MASS INDEX: 28.24 KG/M2 | TEMPERATURE: 98 F | RESPIRATION RATE: 18 BRPM | WEIGHT: 165.38 LBS | HEIGHT: 64 IN | DIASTOLIC BLOOD PRESSURE: 47 MMHG | SYSTOLIC BLOOD PRESSURE: 103 MMHG | HEART RATE: 66 BPM | OXYGEN SATURATION: 95 %

## 2023-01-09 LAB
ALBUMIN SERPL-MCNC: 2.3 G/DL (ref 3.4–5)
ALBUMIN/GLOB SERPL: 0.7 {RATIO} (ref 1–2)
ALP LIVER SERPL-CCNC: 132 U/L
ALT SERPL-CCNC: 34 U/L
AMMONIA PLAS-MCNC: 48 UMOL/L (ref 11–32)
ANION GAP SERPL CALC-SCNC: 6 MMOL/L (ref 0–18)
AST SERPL-CCNC: 57 U/L (ref 15–37)
BASOPHILS # BLD AUTO: 0.01 X10(3) UL (ref 0–0.2)
BASOPHILS NFR BLD AUTO: 0.3 %
BILIRUB SERPL-MCNC: 2.2 MG/DL (ref 0.1–2)
BUN BLD-MCNC: 10 MG/DL (ref 7–18)
CALCIUM BLD-MCNC: 8.4 MG/DL (ref 8.5–10.1)
CHLORIDE SERPL-SCNC: 113 MMOL/L (ref 98–112)
CO2 SERPL-SCNC: 24 MMOL/L (ref 21–32)
CREAT BLD-MCNC: 0.73 MG/DL
EOSINOPHIL # BLD AUTO: 0.12 X10(3) UL (ref 0–0.7)
EOSINOPHIL NFR BLD AUTO: 3.5 %
ERYTHROCYTE [DISTWIDTH] IN BLOOD BY AUTOMATED COUNT: 16.3 %
GFR SERPLBLD BASED ON 1.73 SQ M-ARVRAT: 94 ML/MIN/1.73M2 (ref 60–?)
GLOBULIN PLAS-MCNC: 3.3 G/DL (ref 2.8–4.4)
GLUCOSE BLD-MCNC: 104 MG/DL (ref 70–99)
GLUCOSE BLD-MCNC: 105 MG/DL (ref 70–99)
GLUCOSE BLD-MCNC: 163 MG/DL (ref 70–99)
HCT VFR BLD AUTO: 29.9 %
HGB BLD-MCNC: 9.6 G/DL
IMM GRANULOCYTES # BLD AUTO: 0.01 X10(3) UL (ref 0–1)
IMM GRANULOCYTES NFR BLD: 0.3 %
LYMPHOCYTES # BLD AUTO: 0.89 X10(3) UL (ref 1–4)
LYMPHOCYTES NFR BLD AUTO: 26.2 %
MCH RBC QN AUTO: 32.4 PG (ref 26–34)
MCHC RBC AUTO-ENTMCNC: 32.1 G/DL (ref 31–37)
MCV RBC AUTO: 101 FL
MONOCYTES # BLD AUTO: 1.05 X10(3) UL (ref 0.1–1)
MONOCYTES NFR BLD AUTO: 30.9 %
NEUTROPHILS # BLD AUTO: 1.32 X10 (3) UL (ref 1.5–7.7)
NEUTROPHILS # BLD AUTO: 1.32 X10(3) UL (ref 1.5–7.7)
NEUTROPHILS NFR BLD AUTO: 38.8 %
OSMOLALITY SERPL CALC.SUM OF ELEC: 295 MOSM/KG (ref 275–295)
PLATELET # BLD AUTO: 54 10(3)UL (ref 150–450)
POTASSIUM SERPL-SCNC: 3.6 MMOL/L (ref 3.5–5.1)
PROT SERPL-MCNC: 5.6 G/DL (ref 6.4–8.2)
RBC # BLD AUTO: 2.96 X10(6)UL
SODIUM SERPL-SCNC: 143 MMOL/L (ref 136–145)
WBC # BLD AUTO: 3.4 X10(3) UL (ref 4–11)

## 2023-01-09 PROCEDURE — 99239 HOSP IP/OBS DSCHRG MGMT >30: CPT | Performed by: INTERNAL MEDICINE

## 2023-01-09 RX ORDER — LACTULOSE 20 G/30ML
20 SOLUTION ORAL 3 TIMES DAILY
Qty: 2700 ML | Refills: 0 | Status: SHIPPED | OUTPATIENT
Start: 2023-01-09 | End: 2023-02-08

## 2023-01-09 RX ORDER — POTASSIUM CHLORIDE 20 MEQ/1
40 TABLET, EXTENDED RELEASE ORAL EVERY 4 HOURS
Status: COMPLETED | OUTPATIENT
Start: 2023-01-09 | End: 2023-01-09

## 2023-01-09 NOTE — PROGRESS NOTES
NURSING DISCHARGE NOTE    Discharged Home via Wheelchair. Accompanied by Support staff  Belongings Taken by patient/family. Patient ambulated by staff prior to DC, with steady gait.

## 2023-01-09 NOTE — PHYSICAL THERAPY NOTE
PT orders received, chart reviewed. Per chart review, PT/OT attempted to see pt on 1/8/23, however the RN on this date stated that pt does not need evaluations as pt is up ad bonifacio. Therefore, PT/OT signed off. On this date, 1/9/23, RN contacted rehab department stating that MD would like for pt to be evaluated by PT prior to DC today. Therefore, attempted to see pt for evaluation, however RN states that she just ambulated with the pt and pt is doing well and feels comfortable discharging home, and is requesting to DC home now. RN states pt currently has a ride waiting downstairs and there is no need to see pt at this time.  PAULY

## 2023-01-09 NOTE — PLAN OF CARE
Patient is A&Ox2-3, confused at times. VSS. Afebrile. Room air, no SOB. Refused tele. Carb Controlled diet. Accu- checks QID. Patient denies nausea/vomiting at this time. Poor appetite and frequent diarrhea noted d/t Lactulose. Encouraged PO intake. GI is following. Continent of bowel and bladder. Patient is up adlib. Patient denies pain/discomfort at this time. Right AC - saline locked. Medications were given per MAR. Updated patient on plan of care. All needs met at this time.       Problem: Diabetes/Glucose Control  Goal: Glucose maintained within prescribed range  Description: INTERVENTIONS:  - Monitor Blood Glucose as ordered  - Assess for signs and symptoms of hyperglycemia and hypoglycemia  - Administer ordered medications to maintain glucose within target range  - Assess barriers to adequate nutritional intake and initiate nutrition consult as needed  - Instruct patient on self management of diabetes  Outcome: Progressing     Problem: Patient/Family Goals  Goal: Patient/Family Long Term Goal  Description: Patient's Long Term Goal: Discharge    Interventions:  - GI, hosp consults  - PT/OT consult  - pain control  - See additional Care Plan goals for specific interventions  Outcome: Progressing  Goal: Patient/Family Short Term Goal  Description: Patient's Short Term Goal: Pain management    Interventions:   - PRN pain medications  - See additional Care Plan goals for specific interventions  Outcome: Progressing

## 2023-01-09 NOTE — PLAN OF CARE
Problem: Diabetes/Glucose Control  Goal: Glucose maintained within prescribed range  Description: INTERVENTIONS:  - Monitor Blood Glucose as ordered  - Assess for signs and symptoms of hyperglycemia and hypoglycemia  - Administer ordered medications to maintain glucose within target range  - Assess barriers to adequate nutritional intake and initiate nutrition consult as needed  - Instruct patient on self management of diabetes  Outcome: Progressing     Problem: Patient/Family Goals  Goal: Patient/Family Long Term Goal  Description: Patient's Long Term Goal: Discharge    Interventions:  - GI, hosp consults  - PT/OT consult  - pain control  - See additional Care Plan goals for specific interventions  Outcome: Progressing  Goal: Patient/Family Short Term Goal  Description: Patient's Short Term Goal: Pain management    Interventions:   - PRN pain medications  - See additional Care Plan goals for specific interventions  Outcome: Progressing     A&Ox4. VSS. RA. Telemetry: refused  GI: Abdomen soft, nondistended. Patient reports having watery stool due to the lactulose. : Voids. Denies pain. Up with standby assist.  Diet:tolerating carb controlled diet. IV saline locked. All appropriate safety measures in place. All questions and concerns addressed.  Will continue to monitor

## 2023-08-03 ENCOUNTER — OFFICE VISIT (OUTPATIENT)
Dept: PODIATRY CLINIC | Facility: CLINIC | Age: 63
End: 2023-08-03

## 2023-08-03 DIAGNOSIS — M79.675 TOE PAIN, BILATERAL: ICD-10-CM

## 2023-08-03 DIAGNOSIS — M20.42 HAMMER TOES OF BOTH FEET: ICD-10-CM

## 2023-08-03 DIAGNOSIS — B35.1 ONYCHOMYCOSIS: ICD-10-CM

## 2023-08-03 DIAGNOSIS — M79.674 TOE PAIN, BILATERAL: ICD-10-CM

## 2023-08-03 DIAGNOSIS — E11.42 DIABETIC POLYNEUROPATHY ASSOCIATED WITH TYPE 2 DIABETES MELLITUS (HCC): Primary | ICD-10-CM

## 2023-08-03 DIAGNOSIS — M20.41 HAMMER TOES OF BOTH FEET: ICD-10-CM

## 2023-08-03 DIAGNOSIS — L60.0 INGROWN NAIL: ICD-10-CM

## 2023-08-03 DIAGNOSIS — M34.9 SCLERODERMA (HCC): ICD-10-CM

## 2023-08-03 PROCEDURE — 99213 OFFICE O/P EST LOW 20 MIN: CPT | Performed by: STUDENT IN AN ORGANIZED HEALTH CARE EDUCATION/TRAINING PROGRAM

## 2023-08-03 NOTE — PROGRESS NOTES
[facility] [place], [facility]  Progress Note    Margaret Lackey is a 59year old female. Patient presents with:  Ingrown Toenail: Left great toe- patient denies pain at this time- painful when wearing closed shoes- left 2nd toe is ingrowing. HPI:       Patient is a pleasant 52-year-old female who presents to clinic for evaluation of incurvated and painful toes to her left first and second toes and several other toes of her feet. She has had history of nail avulsion procedure in the past.  She denies any acute signs of infection but does have some pain to toe borders. Patient is diabetic and her last hemoglobin A1c was 7.4%. No other complaints are mentioned. Past medical history Medications, allergies reviewed. Allergies: Clindamycin, Sulfamethoxazole W/Trimethoprim, and Lexapro [Escitalopram]   Current Outpatient Medications   Medication Sig Dispense Refill    ONDANSETRON 4 MG Oral Tablet Dispersible PLACE 1 TABLET ON THE TONGUE EVERY 8 HOURS AS NEEDED FOR NAUSEA 20 tablet 5    Insulin Aspart Pen (NOVOLOG FLEXPEN) 100 UNIT/ML Subcutaneous Solution Pen-injector To inject if glucose over 200. 201-250 3 units, 251-300 4 units, 301-356 5 units, >357 6 units 15 mL 1    TRULICITY 1.5 FW/4.3VV Subcutaneous Solution Pen-injector once a week. On sundays      SPIRIVA RESPIMAT 1.25 MCG/ACT Inhalation Aero Soln Inhale 2 puffs into the lungs daily. TRESIBA FLEXTOUCH 100 UNIT/ML Subcutaneous Solution Pen-injector Inject 60-61 Units into the skin daily. 60 mL 0    propranolol 20 MG Oral Tab Take 1 tablet (20 mg total) by mouth daily. 90 tablet 1    Insulin Pen Needle (BD PEN NEEDLE STEVEN U/F) 32G X 4 MM Does not apply Misc For insulin use once a day (may substitute with formulary brand) 100 each 3    levothyroxine 75 MCG Oral Tab Take 1 tablet po daily on Monday through Friday 90 tablet 3    Blood Glucose Monitoring Suppl (CONTOUR NEXT MONITOR) w/Device Does not apply Kit 1 each by Does not apply route daily. 1 kit 0    Glucose Blood (CONTOUR NEXT TEST) In Vitro Strip Use to test 3 times daily 300 each 1    Microlet Lancets Does not apply Misc Use to test 3 times daily 300 each 1    Glucose Blood (ONETOUCH VERIO) In Vitro Strip Test 3 times. Type 2 insulin dependant diabetic, E11.65 300 strip 3    Blood Glucose Monitoring Suppl (ONETOUCH VERIO) w/Device Does not apply Kit 1 each by Does not apply route 3 (three) times daily. Type 2 insulin dependant diabetic, E11.65 1 kit 0    OneTouch Delica Lancets 55A Does not apply Misc Test 3 times a day. Type 2 insulin dependant diabetic, E11.65 300 each 3    atorvastatin 10 MG Oral Tab Take 1 tablet (10 mg total) by mouth daily. 90 tablet 3    levothyroxine 50 MCG Oral Tab Take 1 tablet po daily on Saturday and Sunday 90 tablet 1    Albuterol Sulfate  (90 Base) MCG/ACT Inhalation Aero Soln Inhale 1 puff into the lungs every 6 (six) hours as needed for Wheezing. fluticasone-salmeterol 500-50 MCG/DOSE Inhalation Aerosol Powder, Breath Activated Inhale 2 puffs into the lungs daily. Montelukast Sodium 10 MG Oral Tab Take 1 tablet (10 mg total) by mouth daily.         Past Medical History:   Diagnosis Date    Abnormal celiac antibody panel 07/2017    +TTG    Alcohol abuse     Alcoholic cirrhosis ([address] Stopped 5/19    quit drinking    Allergic rhinitis     Asthma     managed by her allergist    Asthma     recently using inhaler more since recent cold    Autoimmune disease NEC     scleroderma- in remission     Diabetes ([address] 06/2017    Diabetes mellitus ([address]     Disorder of thyroid     Elevated lipids 06/2017    Elevated liver enzymes 2014    Encounter for insertion of mirena IUD 6/2012    per pt    Esophageal reflux     Esophageal varices ([address] 08/2017    Gout     High blood pressure     High cholesterol     History of recent hospitalization     11/13 after urgent care stop- / paracentesis    HYPOTHYROIDISM     Hypothyroidism     Insomnia, unspecified     Iron deficiency anemia refractory to iron therapy 2019    Iron deficiency anemia refractory to iron therapy 2021    Obesity, unspecified     Patient has active power of  for health care     scanned in media tab    Pneumonia due to organism     Portal hypertensive gastropathy  2017    Thyroid disease     Visual impairment       Past Surgical History:   Procedure Laterality Date      0845,6682    x 2    COLONOSCOPY  2017    diverticulosis    EGD  2017    Grade 1 esophageal varices, portal hypertensive gastropathy, SB Bx taken    HYSTERECTOMY      LAPAROSCOPY PROCEDURE UNLISTED      ovarian cyst    UPPER GI ENDOSCOPY PERFORMED  ,     Grade 2 varices, portal gastropathy      Family History   Problem Relation Age of Onset    Diabetes Father     Diabetes Mother     Heart Disorder Mother         cabg    Heart Disorder Maternal Grandmother     Cancer Maternal Grandmother         small intestine cancer    Lipids Sister     Diabetes Brother         diet controlled diabetes    Breast Cancer Cousin 48        dx age 48    Breast Cancer Paternal Aunt 54        dx age 54    Heart Disease Paternal Grandfather     Hypertension Paternal Grandfather     Hypertension Paternal Grandmother     Stroke Neg       Social History    Socioeconomic History      Marital status:       Spouse name: Carrie Henry      Number of children: 2    Occupational History      Occupation: Teacher, [employer], special ed        Employer: [employer]    Tobacco Use      Smoking status: Never      Smokeless tobacco: Never    Vaping Use      Vaping Use: Never used    Substance and Sexual Activity      Alcohol use: Not Currently        Alcohol/week: 0.0 standard drinks of alcohol        Comment: former alcohol abuse- Not drinking since 2019      Drug use: Never        Comment: last drink was 3 years ago      Sexual activity: Yes        Partners: Male        Birth control/protection: I.U.D.     Other Topics      Concerns:        Self-Exams: No          REVIEW OF SYSTEMS:     Today reviewed systems as documented below  GENERAL HEALTH: feels well otherwise  SKIN: denies any unusual skin lesions or rashes  RESPIRATORY: denies shortness of breath with exertion  CARDIOVASCULAR: denies chest pain on exertion  GI: denies abdominal pain and denies heartburn  NEURO: denies headaches  MUSCULO: denies arthritis, back pain      EXAM:   [facility] 08/09/2007   GENERAL: well developed, well nourished, in no apparent distress  EXTREMITIES:   1. Integument: Normal skin temperature and turgor. Nails x10 are elongated, thickened, dystrophic, and with subungual debris. Several nails are incurvated. No acute signs infection noted. 2. Vascular: Pedal pulses are palpable. 3. Musculoskeletal: All muscle groups are graded 5 out of 5 in the foot and ankle. Flexion contracture of lesser digits. Pain noted area of incurvated nails. 4. Neurological: Normal sharp dull sensation; reflexes normal.  Decreased protective sensation noted to lower extremities. ASSESSMENT AND PLAN:   Diagnoses and all orders for this visit:    Diabetic polyneuropathy associated with type 2 diabetes mellitus (HCC)    Onychomycosis    Ingrown nail    Toe pain, bilateral    Scleroderma (HCC)    Hammer toes of both feet        Plan:     -Patient examined, chart history reviewed. -Discussed importance of proper pedal hygiene, regular foot checks, and tight glucose control.  -Sharply debrided nails x10 with a sterile nail nipper achieving a 20% reduction in thickness and length, without incident. Nails further smoothed with dremel.  -Slant back performed area of ingrown nails without incident. Can use Kerasal to affected nails.  -Ambulate with supportive shoes and inserts and avoid walking barefoot.  -Educated patient on acute signs of infection advised patient to seek immediate medical attention if symptoms arise.      The patient indicates understanding of these issues and agrees to the plan. Return in about 2 months (around 10/3/2023) for routine foot care.     Alaina Graham DPM  8/3/2023

## 2023-08-03 NOTE — PATIENT INSTRUCTIONS
Ambulatory supportive shoes and avoid walking barefoot. Can use Kerasal to affected nails. Check feet daily. Follow-up in 2 months or sooner if other concerns arise.

## 2023-10-10 ENCOUNTER — OFFICE VISIT (OUTPATIENT)
Dept: PODIATRY CLINIC | Facility: CLINIC | Age: 63
End: 2023-10-10

## 2023-10-10 DIAGNOSIS — M79.675 TOE PAIN, BILATERAL: ICD-10-CM

## 2023-10-10 DIAGNOSIS — B35.1 ONYCHOMYCOSIS: ICD-10-CM

## 2023-10-10 DIAGNOSIS — M34.9 SCLERODERMA (HCC): ICD-10-CM

## 2023-10-10 DIAGNOSIS — M20.42 HAMMER TOES OF BOTH FEET: ICD-10-CM

## 2023-10-10 DIAGNOSIS — L60.0 INGROWN NAIL: ICD-10-CM

## 2023-10-10 DIAGNOSIS — M79.674 TOE PAIN, BILATERAL: ICD-10-CM

## 2023-10-10 DIAGNOSIS — M20.41 HAMMER TOES OF BOTH FEET: ICD-10-CM

## 2023-10-10 DIAGNOSIS — E11.42 DIABETIC POLYNEUROPATHY ASSOCIATED WITH TYPE 2 DIABETES MELLITUS (HCC): Primary | ICD-10-CM

## 2023-10-10 NOTE — PROGRESS NOTES
[facility] [place] [facility]  Progress Note    [facility] is a 59year old female. Patient presents with:  Toenail Care: Patient is here for toenail care. Patient rates her pain 4/10 at this time. HPI:     Patient is a pleasant 71-year-old female who presents to clinic for evaluation of incurvated and painful toes to her left first and second toes and several other toes of her feet. She has had history of nail avulsion procedure in the past.  She denies any acute signs of infection but does have some pain to toe borders. Patient is diabetic and her last hemoglobin A1c was 7.4%. Today she rates her pain at a 4 out of 10. No other complaints are mentioned. Past medical history, medications, and allergies reviewed. Allergies: Clindamycin, Sulfamethoxazole W/Trimethoprim, and Lexapro [Escitalopram]   Current Outpatient Medications   Medication Sig Dispense Refill    ONDANSETRON 4 MG Oral Tablet Dispersible PLACE 1 TABLET ON THE TONGUE EVERY 8 HOURS AS NEEDED FOR NAUSEA 20 tablet 5    Insulin Aspart Pen (NOVOLOG FLEXPEN) 100 UNIT/ML Subcutaneous Solution Pen-injector To inject if glucose over 200. 201-250 3 units, 251-300 4 units, 301-356 5 units, >357 6 units 15 mL 1    TRULICITY 1.5 SARA/2.1GZ Subcutaneous Solution Pen-injector once a week. On sundays      SPIRIVA RESPIMAT 1.25 MCG/ACT Inhalation Aero Soln Inhale 2 puffs into the lungs daily. TRESIBA FLEXTOUCH 100 UNIT/ML Subcutaneous Solution Pen-injector Inject 60-61 Units into the skin daily. 60 mL 0    propranolol 20 MG Oral Tab Take 1 tablet (20 mg total) by mouth daily.  90 tablet 1    Insulin Pen Needle (BD PEN NEEDLE STEVEN U/F) 32G X 4 MM Does not apply Misc For insulin use once a day (may substitute with formulary brand) 100 each 3    levothyroxine 75 MCG Oral Tab Take 1 tablet po daily on Monday through Friday 90 tablet 3    Blood Glucose Monitoring Suppl (CONTOUR NEXT MONITOR) w/Device Does not apply Kit 1 each by Does not apply route daily. 1 kit 0    Glucose Blood (CONTOUR NEXT TEST) In Vitro Strip Use to test 3 times daily 300 each 1    Microlet Lancets Does not apply Misc Use to test 3 times daily 300 each 1    Glucose Blood (ONETOUCH VERIO) In Vitro Strip Test 3 times. Type 2 insulin dependant diabetic, E11.65 300 strip 3    Blood Glucose Monitoring Suppl (ONETOUCH VERIO) w/Device Does not apply Kit 1 each by Does not apply route 3 (three) times daily. Type 2 insulin dependant diabetic, E11.65 1 kit 0    OneTouch Delica Lancets 78R Does not apply Misc Test 3 times a day. Type 2 insulin dependant diabetic, E11.65 300 each 3    atorvastatin 10 MG Oral Tab Take 1 tablet (10 mg total) by mouth daily. 90 tablet 3    levothyroxine 50 MCG Oral Tab Take 1 tablet po daily on Saturday and Sunday 90 tablet 1    Albuterol Sulfate  (90 Base) MCG/ACT Inhalation Aero Soln Inhale 1 puff into the lungs every 6 (six) hours as needed for Wheezing. fluticasone-salmeterol 500-50 MCG/DOSE Inhalation Aerosol Powder, Breath Activated Inhale 2 puffs into the lungs daily. Montelukast Sodium 10 MG Oral Tab Take 1 tablet (10 mg total) by mouth daily.         Past Medical History:   Diagnosis Date    Abnormal celiac antibody panel 07/2017    +TTG    Alcohol abuse     Alcoholic cirrhosis ([facility] [address] Stopped 5/19    quit drinking    Allergic rhinitis     Asthma     managed by her allergist    Asthma     recently using inhaler more since recent cold    Autoimmune disease NEC     scleroderma- in remission     Diabetes ([facility] [address] 06/2017    Diabetes mellitus ([facility] [address]     Disorder of thyroid     Elevated lipids 06/2017    Elevated liver enzymes 2014    Encounter for insertion of mirena IUD 6/2012    per pt    Esophageal reflux     Esophageal varices ([address] 08/2017    Gout     High blood pressure     High cholesterol     History of recent hospitalization     11/13 after urgent care stop- / paracentesis    HYPOTHYROIDISM     Hypothyroidism     Insomnia, unspecified Iron deficiency anemia refractory to iron therapy 2019    Iron deficiency anemia refractory to iron therapy 2021    Obesity, unspecified     Patient has active power of  for health care     scanned in media tab    Pneumonia due to organism     Portal hypertensive gastropathy  2017    Thyroid disease     Visual impairment       Past Surgical History:   Procedure Laterality Date      [id number]    x 2    COLONOSCOPY  2017    diverticulosis    EGD  2017    Grade 1 esophageal varices, portal hypertensive gastropathy, SB Bx taken    HYSTERECTOMY      LAPAROSCOPY PROCEDURE UNLISTED      ovarian cyst    UPPER GI ENDOSCOPY PERFORMED  ,     Grade 2 varices, portal gastropathy      Family History   Problem Relation Age of Onset    Diabetes Father     Diabetes Mother     Heart Disorder Mother         cabg    Heart Disorder Maternal Grandmother     Cancer Maternal Grandmother         small intestine cancer    Lipids Sister     Diabetes Brother         diet controlled diabetes    Breast Cancer Cousin 48        dx age 48    Breast Cancer Paternal Aunt 54        dx age 54    Heart Disease Paternal Grandfather     Hypertension Paternal Grandfather     Hypertension Paternal Grandmother     Stroke Neg       Social History    Socioeconomic History      Marital status:       Spouse name: Daiana Mota      Number of children: 2    Occupational History      Occupation: Teacher, [employer], special ed        Employer: [employer]    Tobacco Use      Smoking status: Never      Smokeless tobacco: Never    Vaping Use      Vaping Use: Never used    Substance and Sexual Activity      Alcohol use: Not Currently        Alcohol/week: 0.0 standard drinks of alcohol        Comment: former alcohol abuse- Not drinking since 2019      Drug use: Never        Comment: last drink was 3 years ago      Sexual activity: Yes        Partners: Male        Birth control/protection: I.U.D. Other Topics      Concerns:        Self-Exams: No          REVIEW OF SYSTEMS:     Today reviewed systems as documented below  GENERAL HEALTH: feels well otherwise  SKIN: denies any unusual skin lesions or rashes  RESPIRATORY: denies shortness of breath with exertion  CARDIOVASCULAR: denies chest pain on exertion  GI: denies abdominal pain and denies heartburn  NEURO: denies headaches  MUSCULO: denies arthritis, back pain      EXAM:   [facility] 08/09/2007   GENERAL: well developed, well nourished, in no apparent distress  EXTREMITIES:   1. Integument: Normal skin temperature and turgor. Nails x10 are elongated, thickened, dystrophic, and with subungual debris. Several nails are incurvated. No acute signs infection noted. 2. Vascular: Pedal pulses are palpable. 3. Musculoskeletal: All muscle groups are graded 5 out of 5 in the foot and ankle. Flexion contracture of lesser digits. Pain noted area of incurvated nails. 4. Neurological: Normal sharp dull sensation; reflexes normal.  Decreased protective sensation noted to lower extremities. ASSESSMENT AND PLAN:   Diagnoses and all orders for this visit:    Diabetic polyneuropathy associated with type 2 diabetes mellitus (HCC)    Onychomycosis    Ingrown nail    Toe pain, bilateral    Scleroderma (HCC)    Hammer toes of both feet          Plan:     -Patient examined, chart history reviewed. -Discussed importance of proper pedal hygiene, regular foot checks, and tight glucose control.  -Sharply debrided nails x10 with a sterile nail nipper achieving a 20% reduction in thickness and length, without incident. Nails further smoothed with dremel.  -Slant back performed area of ingrown nails without incident.   -Ambulate with supportive shoes and inserts and avoid walking barefoot. Informational handout dispensed with recommended shoes and inserts.  -Educated patient on acute signs of infection advised patient to seek immediate medical attention if symptoms arise. The patient indicates understanding of these issues and agrees to the plan. RTC 2 months.     Marek Hall DPM

## 2023-12-06 ENCOUNTER — OFFICE VISIT (OUTPATIENT)
Dept: PODIATRY CLINIC | Facility: CLINIC | Age: 63
End: 2023-12-06

## 2023-12-06 DIAGNOSIS — M87.076 AVASCULAR NECROSIS OF BONE OF FOOT (HCC): Primary | ICD-10-CM

## 2023-12-06 DIAGNOSIS — M19.079 OSTEOARTHRITIS OF TALONAVICULAR JOINT: ICD-10-CM

## 2023-12-06 PROCEDURE — 99214 OFFICE O/P EST MOD 30 MIN: CPT | Performed by: STUDENT IN AN ORGANIZED HEALTH CARE EDUCATION/TRAINING PROGRAM

## 2023-12-06 PROCEDURE — L3260 AMBULATORY SURGICAL BOOT EAC: HCPCS | Performed by: STUDENT IN AN ORGANIZED HEALTH CARE EDUCATION/TRAINING PROGRAM

## 2023-12-07 NOTE — PROGRESS NOTES
[facility]  Progress Note    Al Montgomery is a 59year old female. Chief Complaint   Patient presents with    Diabetic Foot Care     F/u diabetic foot care. [id number] this AM., on 8/8/23 A1C= 6.3. Left foot dorsal aspect pain 0- 8/10 worse when applying pressure - onset 2 weeks ago. HPI:     Patient is a pleasant 21-year-old female who presents to clinic for evaluation of incurvated and painful toes to her left first and second toes and several other toes of her feet. She has elongated and thickened nails she has difficulty trimming on her own. Patient is diabetic and her last hemoglobin A1c was 6.3%. Her blood sugars were 113 mg/dL when checked this morning. Patient also presents with separate complaint of pain and swelling to her left foot. She especially has pain in her midfoot region at TN joint. Pain can reach an 8 out of 10. This is been going on for the last 3 weeks. She is unsure what the causes. Past medical history, medications, and allergies reviewed. Allergies: Clindamycin, Sulfamethoxazole w/trimethoprim, and Lexapro [escitalopram]   Current Outpatient Medications   Medication Sig Dispense Refill    ONDANSETRON 4 MG Oral Tablet Dispersible PLACE 1 TABLET ON THE TONGUE EVERY 8 HOURS AS NEEDED FOR NAUSEA 20 tablet 5    Insulin Aspart Pen (NOVOLOG FLEXPEN) 100 UNIT/ML Subcutaneous Solution Pen-injector To inject if glucose over 200. 201-250 3 units, 251-300 4 units, 301-356 5 units, >357 6 units 15 mL 1    TRULICITY 1.5 BL/3.5WM Subcutaneous Solution Pen-injector once a week. On sundays      SPIRIVA RESPIMAT 1.25 MCG/ACT Inhalation Aero Soln Inhale 2 puffs into the lungs daily. TRESIBA FLEXTOUCH 100 UNIT/ML Subcutaneous Solution Pen-injector Inject 60-61 Units into the skin daily. 60 mL 0    propranolol 20 MG Oral Tab Take 1 tablet (20 mg total) by mouth daily.  90 tablet 1    Insulin Pen Needle (BD PEN NEEDLE STEVEN U/F) 32G X 4 MM Does not apply Misc For insulin use once a day (may substitute with formulary brand) 100 each 3    levothyroxine 75 MCG Oral Tab Take 1 tablet po daily on Monday through Friday 90 tablet 3    Blood Glucose Monitoring Suppl (CONTOUR NEXT MONITOR) w/Device Does not apply Kit 1 each by Does not apply route daily. 1 kit 0    Glucose Blood (CONTOUR NEXT TEST) In Vitro Strip Use to test 3 times daily 300 each 1    Microlet Lancets Does not apply Misc Use to test 3 times daily 300 each 1    Glucose Blood (ONETOUCH VERIO) In Vitro Strip Test 3 times. Type 2 insulin dependant diabetic, E11.65 300 strip 3    Blood Glucose Monitoring Suppl (ONETOUCH VERIO) w/Device Does not apply Kit 1 each by Does not apply route 3 (three) times daily. Type 2 insulin dependant diabetic, E11.65 1 kit 0    OneTouch Delica Lancets 93T Does not apply Misc Test 3 times a day. Type 2 insulin dependant diabetic, E11.65 300 each 3    atorvastatin 10 MG Oral Tab Take 1 tablet (10 mg total) by mouth daily. 90 tablet 3    levothyroxine 50 MCG Oral Tab Take 1 tablet po daily on Saturday and Sunday 90 tablet 1    Albuterol Sulfate  (90 Base) MCG/ACT Inhalation Aero Soln Inhale 1 puff into the lungs every 6 (six) hours as needed for Wheezing. fluticasone-salmeterol 500-50 MCG/DOSE Inhalation Aerosol Powder, Breath Activated Inhale 2 puffs into the lungs daily. Montelukast Sodium 10 MG Oral Tab Take 1 tablet (10 mg total) by mouth daily.         Past Medical History:   Diagnosis Date    Abnormal celiac antibody panel 07/2017    +TTG    Alcohol abuse     Alcoholic cirrhosis ([address] Stopped 5/19    quit drinking    Allergic rhinitis     Asthma     managed by her allergist    Asthma     recently using inhaler more since recent cold    Autoimmune disease NEC     scleroderma- in remission     Diabetes ([address] 06/2017    Diabetes mellitus [address] [address]     Disorder of thyroid     Elevated lipids 06/2017    Elevated liver enzymes 2014    Encounter for insertion of mirena IUD 6/2012 per pt    Esophageal reflux     Esophageal varices (HCC) 2017    Gout     High blood pressure     High cholesterol     History of recent hospitalization      after urgent care stop- / paracentesis    HYPOTHYROIDISM     Hypothyroidism     Insomnia, unspecified     Iron deficiency anemia refractory to iron therapy 2019    Iron deficiency anemia refractory to iron therapy 2021    Obesity, unspecified     Patient has active power of  for health care     scanned in media tab    Pneumonia due to organism     Portal hypertensive gastropathy  2017    Thyroid disease     Visual impairment       Past Surgical History:   Procedure Laterality Date      4102,[id number]    x 2    COLONOSCOPY  2017    diverticulosis    EGD  2017    Grade 1 esophageal varices, portal hypertensive gastropathy, SB Bx taken    HYSTERECTOMY      LAPAROSCOPY PROCEDURE UNLISTED      ovarian cyst    UPPER GI ENDOSCOPY PERFORMED  ,     Grade 2 varices, portal gastropathy      Family History   Problem Relation Age of Onset    Diabetes Father     Diabetes Mother     Heart Disorder Mother         cabg    Heart Disorder Maternal Grandmother     Cancer Maternal Grandmother         small intestine cancer    Lipids Sister     Diabetes Brother         diet controlled diabetes    Breast Cancer Cousin 48        dx age 48    Breast Cancer Paternal Aunt 54        dx age 54    Heart Disease Paternal Grandfather     Hypertension Paternal Grandfather     Hypertension Paternal Grandmother     Stroke Neg       Social History     Socioeconomic History    Marital status:      Spouse name: Tali Gonzalez    Number of children: 2   Occupational History    Occupation: Teacher, [place], special ed     Employer: [employer] [address]   Tobacco Use    Smoking status: Never    Smokeless tobacco: Never   Vaping Use    Vaping Use: Never used   Substance and Sexual Activity    Alcohol use: Not Currently     Alcohol/week: 0.0 standard drinks of alcohol     Comment: former alcohol abuse- Not drinking since 5/2019    Drug use: Never     Comment: last drink was 3 years ago    Sexual activity: Yes     Partners: Male     Birth control/protection: I.U.D. Other Topics Concern    Self-Exams No           REVIEW OF SYSTEMS:     Today reviewed systems as documented below  GENERAL HEALTH: feels well otherwise  SKIN: denies any unusual skin lesions or rashes  RESPIRATORY: denies shortness of breath with exertion  CARDIOVASCULAR: denies chest pain on exertion  GI: denies abdominal pain and denies heartburn  NEURO: denies headaches  MUSCULO: history of arthritis       EXAM:   LMP 08/09/2007   GENERAL: well developed, well nourished, in no apparent distress  EXTREMITIES:   1. Integument: Normal skin temperature and turgor. Nails x10 are elongated, thickened, dystrophic, and with subungual debris. Several nails are incurvated. No acute signs infection noted. 2. Vascular: Pedal pulses are palpable. 3. Musculoskeletal: All muscle groups are graded 5 out of 5 in the foot and ankle. Flexion contracture of lesser digits. Pain noted area of incurvated nails. Pain on palpation noted to TN joint of the left foot. Edema noted to left foot. 4. Neurological: Normal sharp dull sensation; reflexes normal.  Decreased protective sensation noted to lower extremities. ASSESSMENT AND PLAN:   Diagnoses and all orders for this visit:    Avascular necrosis of bone of foot (HCC)  -     [facility] AMB POD XR - LT FOOT 3 VIEWS(AP,LAT,OBLIQUE) WT BEARING  -     MRI FOOT (W+WO), LEFT (CPT=73720); Future    Osteoarthritis of talonavicular joint          Plan:     -Patient examined, chart history reviewed. -X-rays obtained and reviewed--advanced degenerative changes noted at TN joint of left foot. Patient does have pain and swelling at site as well. High amount of sclerosis to the left navicular.   -Given new onset pain and swelling and concerning findings on x-ray will order MRI to rule out AVN or other concerns to region.  -Postop shoe dispensed patient is to use for ambulation of left foot for the time being.  -Discussed importance of proper pedal hygiene, regular foot checks, and tight glucose control.  -Sharply debrided nails x10 with a sterile nail nipper achieving a 20% reduction in thickness and length, without incident. Nails further smoothed with dremel.  -Slant back performed area of ingrown nails without incident.   -Ambulate with supportive shoes and inserts and avoid walking barefoot. Informational handout dispensed with recommended shoes and inserts.  -Educated patient on acute signs of infection advised patient to seek immediate medical attention if symptoms arise. I spent 30 minutes with the patient. This time included:    preparing to see the patient (eg, review notes and recent diagnostics),  seeing the patient, obtaining and/or reviewing separately obtained history, performing a medically appropriate examination and/or evaluation, counseling and educating the patient, documenting in the record. The patient indicates understanding of these issues and agrees to the plan. We will call with MRI results and discuss next steps.     David Yeboah DPM

## 2023-12-26 ENCOUNTER — TELEPHONE (OUTPATIENT)
Dept: PODIATRY CLINIC | Facility: CLINIC | Age: 63
End: 2023-12-26

## 2023-12-27 ENCOUNTER — HOSPITAL ENCOUNTER (OUTPATIENT)
Dept: MRI IMAGING | Age: 63
Discharge: HOME | End: 2023-12-27
Attending: STUDENT IN AN ORGANIZED HEALTH CARE EDUCATION/TRAINING PROGRAM
Payer: COMMERCIAL

## 2023-12-27 DIAGNOSIS — M87.076 AVASCULAR NECROSIS OF BONE OF FOOT (HCC): ICD-10-CM

## 2023-12-27 PROCEDURE — 73720 MRI LWR EXTREMITY W/O&W/DYE: CPT | Performed by: STUDENT IN AN ORGANIZED HEALTH CARE EDUCATION/TRAINING PROGRAM

## 2023-12-27 PROCEDURE — A9575 INJ GADOTERATE MEGLUMI 0.1ML: HCPCS | Performed by: STUDENT IN AN ORGANIZED HEALTH CARE EDUCATION/TRAINING PROGRAM

## 2023-12-27 RX ORDER — GADOTERATE MEGLUMINE 376.9 MG/ML
15 INJECTION INTRAVENOUS
Status: COMPLETED | OUTPATIENT
Start: 2023-12-27 | End: 2023-12-27

## 2023-12-27 RX ADMIN — GADOTERATE MEGLUMINE 15 ML: 376.9 INJECTION INTRAVENOUS at 14:50:00

## 2023-12-28 ENCOUNTER — OFFICE VISIT (OUTPATIENT)
Dept: PODIATRY CLINIC | Facility: CLINIC | Age: 63
End: 2023-12-28

## 2023-12-28 DIAGNOSIS — L60.0 INGROWN NAIL: ICD-10-CM

## 2023-12-28 DIAGNOSIS — B35.1 ONYCHOMYCOSIS: ICD-10-CM

## 2023-12-28 DIAGNOSIS — M19.079 OSTEOARTHRITIS OF TALONAVICULAR JOINT: Primary | ICD-10-CM

## 2023-12-28 DIAGNOSIS — E11.42 DIABETIC POLYNEUROPATHY ASSOCIATED WITH TYPE 2 DIABETES MELLITUS (HCC): ICD-10-CM

## 2023-12-28 PROCEDURE — 99213 OFFICE O/P EST LOW 20 MIN: CPT | Performed by: STUDENT IN AN ORGANIZED HEALTH CARE EDUCATION/TRAINING PROGRAM

## 2023-12-28 NOTE — PROGRESS NOTES
[facility], [facility] Podiatry  Progress Note    Faisal Antunez is a 59year old female. Chief Complaint   Patient presents with    Follow - Up     Follow up MRI results Left foot. HPI:     Patient is a pleasant 52-year-old female who presents to clinic for follow-up of left foot MRI results. She relates that her left foot pain and swelling is doing better since last visit. She did immobilize site with boot for little bit and is focusing on wearing supportive shoes. Her MRI did show fairly advanced arthritic changes to TN joint and first TMT J but no AVN. No other complaints are mentioned    Allergies: Clindamycin, Sulfamethoxazole w/trimethoprim, and Lexapro [escitalopram]   Current Outpatient Medications   Medication Sig Dispense Refill    ONDANSETRON 4 MG Oral Tablet Dispersible PLACE 1 TABLET ON THE TONGUE EVERY 8 HOURS AS NEEDED FOR NAUSEA 20 tablet 5    Insulin Aspart Pen (NOVOLOG FLEXPEN) 100 UNIT/ML Subcutaneous Solution Pen-injector To inject if glucose over 200. 201-250 3 units, 251-300 4 units, 301-356 5 units, >357 6 units 15 mL 1    TRULICITY 1.5 OZ/7.6WQ Subcutaneous Solution Pen-injector once a week. On sundays      SPIRIVA RESPIMAT 1.25 MCG/ACT Inhalation Aero Soln Inhale 2 puffs into the lungs daily. TRESIBA FLEXTOUCH 100 UNIT/ML Subcutaneous Solution Pen-injector Inject 60-61 Units into the skin daily. 60 mL 0    propranolol 20 MG Oral Tab Take 1 tablet (20 mg total) by mouth daily. 90 tablet 1    Insulin Pen Needle (BD PEN NEEDLE STEVEN U/F) 32G X 4 MM Does not apply Misc For insulin use once a day (may substitute with formulary brand) 100 each 3    levothyroxine 75 MCG Oral Tab Take 1 tablet po daily on Monday through Friday 90 tablet 3    Blood Glucose Monitoring Suppl (CONTOUR NEXT MONITOR) w/Device Does not apply Kit 1 each by Does not apply route daily.  1 kit 0    Glucose Blood (CONTOUR NEXT TEST) In Vitro Strip Use to test 3 times daily 300 each 1    Microlet Lancets Does not apply Misc Use to test 3 times daily 300 each 1    Glucose Blood (ONETOUCH VERIO) In Vitro Strip Test 3 times. Type 2 insulin dependant diabetic, E11.65 300 strip 3    Blood Glucose Monitoring Suppl (ONETOUCH VERIO) w/Device Does not apply Kit 1 each by Does not apply route 3 (three) times daily. Type 2 insulin dependant diabetic, E11.65 1 kit 0    OneTouch Delica Lancets 84H Does not apply Misc Test 3 times a day. Type 2 insulin dependant diabetic, E11.65 300 each 3    atorvastatin 10 MG Oral Tab Take 1 tablet (10 mg total) by mouth daily. 90 tablet 3    levothyroxine 50 MCG Oral Tab Take 1 tablet po daily on Saturday and Sunday 90 tablet 1    Albuterol Sulfate  (90 Base) MCG/ACT Inhalation Aero Soln Inhale 1 puff into the lungs every 6 (six) hours as needed for Wheezing. fluticasone-salmeterol 500-50 MCG/DOSE Inhalation Aerosol Powder, Breath Activated Inhale 2 puffs into the lungs daily. Montelukast Sodium 10 MG Oral Tab Take 1 tablet (10 mg total) by mouth daily.         Past Medical History:   Diagnosis Date    Abnormal celiac antibody panel 07/2017    +TTG    Alcohol abuse     Alcoholic cirrhosis ([address] Stopped 5/19    quit drinking    Allergic rhinitis     Asthma     managed by her allergist    Asthma     recently using inhaler more since recent cold    Autoimmune disease NEC     scleroderma- in remission     Diabetes ([address] 06/2017    Diabetes mellitus ([facility]     Disorder of thyroid     Elevated lipids 06/2017    Elevated liver enzymes 2014    Encounter for insertion of mirena IUD 6/2012    per pt    Esophageal reflux     Esophageal varices ([address] 08/2017    Gout     High blood pressure     High cholesterol     History of recent hospitalization     11/13 after urgent care stop- / paracentesis    HYPOTHYROIDISM     Hypothyroidism     Insomnia, unspecified     Iron deficiency anemia refractory to iron therapy 6/28/2019    Iron deficiency anemia refractory to iron therapy 1/7/2021    Obesity, unspecified     Patient has active power of  for health care     scanned in media tab    Pneumonia due to organism     Portal hypertensive gastropathy  ([facility] [address] 2017    Thyroid disease     Visual impairment       Past Surgical History:   Procedure Laterality Date      6203,0759    x 2    COLONOSCOPY  2017    diverticulosis    EGD  2017    Grade 1 esophageal varices, portal hypertensive gastropathy, SB Bx taken    HYSTERECTOMY      LAPAROSCOPY PROCEDURE UNLISTED      ovarian cyst    UPPER GI ENDOSCOPY PERFORMED  ,     Grade 2 varices, portal gastropathy      Family History   Problem Relation Age of Onset    Diabetes Father     Diabetes Mother     Heart Disorder Mother         cabg    Heart Disorder Maternal Grandmother     Cancer Maternal Grandmother         small intestine cancer    Lipids Sister     Diabetes Brother         diet controlled diabetes    Breast Cancer Cousin 48        dx age 48    Breast Cancer Paternal Aunt 54        dx age 54    Heart Disease Paternal Grandfather     Hypertension Paternal Grandfather     Hypertension Paternal Grandmother     Stroke Neg       Social History     Socioeconomic History    Marital status:      Spouse name: Virgilio Davison    Number of children: 2   Occupational History    Occupation: Teacher, [place], special ed     Employer: [employer] [address]   Tobacco Use    Smoking status: Never    Smokeless tobacco: Never   Vaping Use    Vaping Use: Never used   Substance and Sexual Activity    Alcohol use: Not Currently     Alcohol/week: 0.0 standard drinks of alcohol     Comment: former alcohol abuse- Not drinking since 2019    Drug use: Never     Comment: last drink was 3 years ago    Sexual activity: Yes     Partners: Male     Birth control/protection: I.U.D.    Other Topics Concern    Self-Exams No           REVIEW OF SYSTEMS:     Today reviewed systems as documented below  GENERAL HEALTH: feels well otherwise  SKIN: denies any unusual skin lesions or rashes  RESPIRATORY: denies shortness of breath with exertion  CARDIOVASCULAR: denies chest pain on exertion  GI: denies abdominal pain and denies heartburn  NEURO: denies headaches  MUSCULO: history of arthritis       EXAM:   [facility] 08/09/2007   GENERAL: well developed, well nourished, in no apparent distress  EXTREMITIES:   1. Integument: Normal skin temperature and turgor. 2. Vascular: Pedal pulses are palpable. 3. Musculoskeletal: All muscle groups are graded 5 out of 5 in the foot and ankle. Flexion contracture of lesser digits. Mild pain on palpation noted to TN joint of the left foot. Edema to left foot has resolved. 4. Neurological: Normal sharp dull sensation; reflexes normal.  Decreased protective sensation noted to lower extremities. MRI FOOT (W+WO), LEFT (CPT=73720)    Result Date: 12/27/2023  CONCLUSION:   1. Moderate degenerative arthritic change involving the talonavicular joint. 2. Moderate to severe degenerative changes with reactive marrow edema involving the articulation of the medial cuneiform and the 1st metatarsal.  3. Reactive marrow edema along the mid shaft and base of the 2nd metatarsal could be due to early stress reaction or fracture. LOCATION:  [facility]   Dictated by (CST): Holly Metcalf MD on 12/27/2023 at 3:29 PM     Finalized by (CST): Holly Metcalf MD on 12/27/2023 at 3:35 PM         ASSESSMENT AND PLAN:   Diagnoses and all orders for this visit:    Osteoarthritis of talonavicular joint    Diabetic polyneuropathy associated with type 2 diabetes mellitus ([facility] [address]    Onychomycosis    Ingrown nail          Plan:     -Patient examined, chart history reviewed. -X-rays obtained and reviewed--advanced degenerative changes noted at TN joint of left foot. Patient does have pain and swelling at site as well. High amount of sclerosis to the left navicular. -MRI results reviewed with patient.   Moderate to severe arthritic changes noted to TN joint and first TMT J but no evidence of AVN to the navicular. Patient is now doing better clinically after period of rest.  She should continue to ambulate supportive shoes and avoid walking barefoot. If symptoms worsen or fail to improve could consider cortisone injection to painful joint as well.  -Patient will monitor and follow-up as needed for this.  -Can also follow-up as needed for nail care given her chronically ingrown nails. The patient indicates understanding of these issues and agrees to the plan.       Nery Elizalde DPM

## 2024-02-14 ENCOUNTER — OFFICE VISIT (OUTPATIENT)
Dept: PODIATRY CLINIC | Facility: CLINIC | Age: 64
End: 2024-02-14

## 2024-02-14 DIAGNOSIS — B35.1 ONYCHOMYCOSIS: ICD-10-CM

## 2024-02-14 DIAGNOSIS — M19.079 OSTEOARTHRITIS OF TALONAVICULAR JOINT: ICD-10-CM

## 2024-02-14 DIAGNOSIS — M79.675 TOE PAIN, BILATERAL: ICD-10-CM

## 2024-02-14 DIAGNOSIS — M20.41 HAMMER TOES OF BOTH FEET: ICD-10-CM

## 2024-02-14 DIAGNOSIS — L60.0 INGROWN NAIL: ICD-10-CM

## 2024-02-14 DIAGNOSIS — E11.42 DIABETIC POLYNEUROPATHY ASSOCIATED WITH TYPE 2 DIABETES MELLITUS (HCC): Primary | ICD-10-CM

## 2024-02-14 DIAGNOSIS — M79.674 TOE PAIN, BILATERAL: ICD-10-CM

## 2024-02-14 DIAGNOSIS — M20.42 HAMMER TOES OF BOTH FEET: ICD-10-CM

## 2024-02-14 PROCEDURE — 99213 OFFICE O/P EST LOW 20 MIN: CPT | Performed by: STUDENT IN AN ORGANIZED HEALTH CARE EDUCATION/TRAINING PROGRAM

## 2024-02-15 NOTE — PROGRESS NOTES
[facility] Podiatry  Progress Note    Lawanda Simms is a 63 year old female.   Chief Complaint   Patient presents with    Diabetic Foot Care     Nail care and foot check -- A1C 6.3 on 8/8.         HPI:     Patient is a pleasant 63-year-old female who presents to clinic for routine footcare.  She relates that her left foot is doing better since last visit.  She has been trying to ambulate with supportive shoes.  Her blood sugars were 108 mg/dL when checked this morning.  Her last hemoglobin A1c was 6.3% on 8/8/2024.  No other complaints are mentioned.      Allergies: Clindamycin, Sulfamethoxazole w/trimethoprim, and Lexapro [escitalopram]   Current Outpatient Medications   Medication Sig Dispense Refill    ONDANSETRON 4 MG Oral Tablet Dispersible PLACE 1 TABLET ON THE TONGUE EVERY 8 HOURS AS NEEDED FOR NAUSEA 20 tablet 5    Insulin Aspart Pen (NOVOLOG FLEXPEN) 100 UNIT/ML Subcutaneous Solution Pen-injector To inject if glucose over 200. 201-250 3 units, 251-300 4 units, 301-356 5 units, >357 6 units 15 mL 1    TRULICITY 1.5 MG/0.5ML Subcutaneous Solution Pen-injector once a week. On sundays      SPIRIVA RESPIMAT 1.25 MCG/ACT Inhalation Aero Soln Inhale 2 puffs into the lungs daily.      TRESIBA FLEXTOUCH 100 UNIT/ML Subcutaneous Solution Pen-injector Inject 60-61 Units into the skin daily. 60 mL 0    propranolol 20 MG Oral Tab Take 1 tablet (20 mg total) by mouth daily. 90 tablet 1    Insulin Pen Needle (BD PEN NEEDLE STEVEN U/F) 32G X 4 MM Does not apply Misc For insulin use once a day (may substitute with formulary brand) 100 each 3    levothyroxine 75 MCG Oral Tab Take 1 tablet po daily on Monday through Friday 90 tablet 3    Blood Glucose Monitoring Suppl (CONTOUR NEXT MONITOR) w/Device Does not apply Kit 1 each by Does not apply route daily. 1 kit 0    Glucose Blood (CONTOUR NEXT TEST) In Vitro Strip Use to test 3 times daily 300 each 1    Microlet Lancets Does not apply Misc Use to test 3  times daily 300 each 1    Glucose Blood (ONETOUCH VERIO) In Vitro Strip Test 3 times. Type 2 insulin dependant diabetic, E11.65 300 strip 3    Blood Glucose Monitoring Suppl (ONETOUCH VERIO) w/Device Does not apply Kit 1 each by Does not apply route 3 (three) times daily. Type 2 insulin dependant diabetic, E11.65 1 kit 0    OneTouch Delica Lancets 33G Does not apply Misc Test 3 times a day. Type 2 insulin dependant diabetic, E11.65 300 each 3    atorvastatin 10 MG Oral Tab Take 1 tablet (10 mg total) by mouth daily. 90 tablet 3    levothyroxine 50 MCG Oral Tab Take 1 tablet po daily on Saturday and Sunday 90 tablet 1    Albuterol Sulfate  (90 Base) MCG/ACT Inhalation Aero Soln Inhale 1 puff into the lungs every 6 (six) hours as needed for Wheezing.      fluticasone-salmeterol 500-50 MCG/DOSE Inhalation Aerosol Powder, Breath Activated Inhale 2 puffs into the lungs daily.      Montelukast Sodium 10 MG Oral Tab Take 1 tablet (10 mg total) by mouth daily.        Past Medical History:   Diagnosis Date    Abnormal celiac antibody panel 07/2017    +TTG    Alcohol abuse     Alcoholic cirrhosis (HCC) Stopped 5/19    quit drinking    Allergic rhinitis     Asthma     managed by her allergist    Asthma     recently using inhaler more since recent cold    Autoimmune disease NEC     scleroderma- in remission     Diabetes (HCC) 06/2017    Diabetes mellitus (HCC)     Disorder of thyroid     Elevated lipids 06/2017    Elevated liver enzymes 2014    Encounter for insertion of mirena IUD 6/2012    per pt    Esophageal reflux     Esophageal varices (HCC) 08/2017    Gout     High blood pressure     High cholesterol     History of recent hospitalization     11/13 after urgent care stop- / paracentesis    HYPOTHYROIDISM     Hypothyroidism     Insomnia, unspecified     Iron deficiency anemia refractory to iron therapy 6/28/2019    Iron deficiency anemia refractory to iron therapy 1/7/2021    Obesity, unspecified     Patient has  active power of  for health care     scanned in media tab    Pneumonia due to organism     Portal hypertensive gastropathy  (HCC) 2017    Thyroid disease     Visual impairment       Past Surgical History:   Procedure Laterality Date      ,1997    x 2    COLONOSCOPY  2017    diverticulosis    EGD  2017    Grade 1 esophageal varices, portal hypertensive gastropathy, SB Bx taken    HYSTERECTOMY      LAPAROSCOPY PROCEDURE UNLISTED      ovarian cyst    UPPER GI ENDOSCOPY PERFORMED  ,     Grade 2 varices, portal gastropathy      Family History   Problem Relation Age of Onset    Diabetes Father     Diabetes Mother     Heart Disorder Mother         cabg    Heart Disorder Maternal Grandmother     Cancer Maternal Grandmother         small intestine cancer    Lipids Sister     Diabetes Brother         diet controlled diabetes    Breast Cancer Cousin 50        dx age 50    Breast Cancer Paternal Aunt 55        dx age 55    Heart Disease Paternal Grandfather     Hypertension Paternal Grandfather     Hypertension Paternal Grandmother     Stroke Neg       Social History     Socioeconomic History    Marital status:      Spouse name: Lalo    Number of children: 2   Occupational History    Occupation: Teacher, [employer], special ed     Employer: [employer]   Tobacco Use    Smoking status: Never    Smokeless tobacco: Never   Vaping Use    Vaping Use: Never used   Substance and Sexual Activity    Alcohol use: Not Currently     Alcohol/week: 0.0 standard drinks of alcohol     Comment: former alcohol abuse- Not drinking since 2019    Drug use: Never     Comment: last drink was 3 years ago    Sexual activity: Yes     Partners: Male     Birth control/protection: I.U.D.   Other Topics Concern    Self-Exams No           REVIEW OF SYSTEMS:     Today reviewed systems as documented below  GENERAL HEALTH: feels well otherwise  SKIN: denies any unusual skin lesions or  rashes  RESPIRATORY: denies shortness of breath with exertion  CARDIOVASCULAR: denies chest pain on exertion  GI: denies abdominal pain and denies heartburn  NEURO: denies headaches  MUSCULO: history of arthritis       EXAM:   LMP 08/09/2007   GENERAL: well developed, well nourished, in no apparent distress  EXTREMITIES:   1. Integument: Normal skin temperature and turgor.  Nails x 10 are elongated and thickened.  2. Vascular: Pedal pulses are palpable.   3. Musculoskeletal: All muscle groups are graded 5 out of 5 in the foot and ankle.  Flexion contracture of lesser digits.  Mild pain on palpation noted to TN joint of the left foot.  Edema to left foot has resolved.   4. Neurological: Normal sharp dull sensation; reflexes normal.  Decreased protective sensation noted to lower extremities.           ASSESSMENT AND PLAN:   Diagnoses and all orders for this visit:    Diabetic polyneuropathy associated with type 2 diabetes mellitus (HCC)    Osteoarthritis of talonavicular joint    Onychomycosis    Ingrown nail    Toe pain, bilateral    Hammer toes of both feet          Plan:     -Patient examined, chart history reviewed.  -Prior MRI results reviewed with patient.  Moderate to severe arthritic changes noted to TN joint and first TMT J but no evidence of AVN to the navicular.  Patient is now doing better clinically after period of rest.  She should continue to ambulate supportive shoes and avoid walking barefoot.  If symptoms worsen or fail to improve could consider cortisone injection to painful joint as well.  Not needed at this time.  -Sharply debrided nails x 10 with nail nipper without incident.  Slant back performed areas of ingrown nails.  Nails further smoothed with Dremel.  RTC 2 to 3 months for routine footcare.        The patient indicates understanding of these issues and agrees to the plan.      Joel Mendieta DPM

## 2025-01-31 NOTE — TELEPHONE ENCOUNTER
Re: Request for Information  Dear  and office staff, we are requesting information from your office on patient Celina Warren, MRN: [id number], : 1970.     Date of surgery: 2025  Surgeon(s):  Zuleyka Ortiz MD  Procedure(s):  DILATATION AND CURETTAGE, HYSTEROSCOPY, POLYPECTOMY WITH CLARK AND NEPHEW    Please fax the following items as noted below:    [] Labs within 90 days  [] EKG within 6 months  [] Medical Clearance within 30 days   [] Cardiac Clearance within 30 days   [x] History and Physical within 30 days   [] Chest X-ray  [] Other     Please fax back as soon as possible to [phone]. If you have any questions, please contact the [facility] Pre-Surgical Testing at [phone] as soon as possible to avoid any delay in service for your patient.    All documentation (ie. History and Physical, labs) MUST contain name and date of birth on EACH page.    Thank you for helping us provide your patient with the best possible experience!   This note has been completed, thanks.

## 2025-03-11 ENCOUNTER — OFFICE VISIT (OUTPATIENT)
Dept: PODIATRY CLINIC | Facility: CLINIC | Age: 65
End: 2025-03-11
Payer: COMMERCIAL

## 2025-03-11 DIAGNOSIS — L60.0 INGROWN NAIL: ICD-10-CM

## 2025-03-11 DIAGNOSIS — M20.42 HAMMER TOES OF BOTH FEET: ICD-10-CM

## 2025-03-11 DIAGNOSIS — R60.0 EDEMA OF LOWER EXTREMITY: ICD-10-CM

## 2025-03-11 DIAGNOSIS — B35.1 ONYCHOMYCOSIS: ICD-10-CM

## 2025-03-11 DIAGNOSIS — E11.42 DIABETIC POLYNEUROPATHY ASSOCIATED WITH TYPE 2 DIABETES MELLITUS (HCC): Primary | ICD-10-CM

## 2025-03-11 DIAGNOSIS — M20.41 HAMMER TOES OF BOTH FEET: ICD-10-CM

## 2025-03-11 PROCEDURE — 99213 OFFICE O/P EST LOW 20 MIN: CPT

## 2025-03-11 RX ORDER — BUMETANIDE 1 MG/1
1 TABLET ORAL DAILY
COMMUNITY
Start: 2025-02-23 | End: 2025-03-15

## 2025-03-11 RX ORDER — MYCOPHENOLATE MOFETIL 250 MG/1
750 CAPSULE ORAL 2 TIMES DAILY
COMMUNITY
Start: 2025-03-05

## 2025-03-11 RX ORDER — TACROLIMUS 1 MG/1
4 CAPSULE ORAL 2 TIMES DAILY
COMMUNITY
Start: 2025-01-27

## 2025-03-11 NOTE — PROGRESS NOTES
[facility] Podiatry  Progress Note    Lawanda Simms is a 64 year old female.   Chief Complaint   Patient presents with    Diabetic Foot Care     Follow up diabetic care. Recently had a liver transplant. Blood sugar 87         HPI:     Patient is a pleasant 63-year-old female who presents to clinic for routine foot care and nail trim.  She relates that she has not been seen in a while as she had a liver transplant and has been in and out of the hospital for months. She does relay that she continues to have swelling to bilateral lower extremities. She is wearing compressive knee-hi socks and is ambulatory in wide moccasin shoes to accommodate for the swelling.  Her blood sugar was 87 mg/dL when checked this morning.  Her last hemoglobin A1c was 5.9% on 2/25/2025.  No other complaints are mentioned.      Allergies: Clindamycin, Sulfamethoxazole w/trimethoprim, and Lexapro [escitalopram]   Current Outpatient Medications   Medication Sig Dispense Refill    bumetanide 1 MG Oral Tab Take 1 tablet (1 mg total) by mouth daily.      mycophenolate mofetil 250 MG Oral Cap Take 3 capsules (750 mg total) by mouth 2 (two) times daily.      tacrolimus 1 MG Oral Cap Take 4 capsules (4 mg total) by mouth 2 (two) times daily.      ONDANSETRON 4 MG Oral Tablet Dispersible PLACE 1 TABLET ON THE TONGUE EVERY 8 HOURS AS NEEDED FOR NAUSEA 20 tablet 5    Insulin Aspart Pen (NOVOLOG FLEXPEN) 100 UNIT/ML Subcutaneous Solution Pen-injector To inject if glucose over 200. 201-250 3 units, 251-300 4 units, 301-356 5 units, >357 6 units 15 mL 1    TRULICITY 1.5 MG/0.5ML Subcutaneous Solution Pen-injector once a week. On sundays      SPIRIVA RESPIMAT 1.25 MCG/ACT Inhalation Aero Soln Inhale 2 puffs into the lungs daily.      TRESIBA FLEXTOUCH 100 UNIT/ML Subcutaneous Solution Pen-injector Inject 60-61 Units into the skin daily. 60 mL 0    propranolol 20 MG Oral Tab Take 1 tablet (20 mg total) by mouth daily. 90 tablet 1    Insulin  Pen Needle (BD PEN NEEDLE STEVEN U/F) 32G X 4 MM Does not apply Misc For insulin use once a day (may substitute with formulary brand) 100 each 3    levothyroxine 75 MCG Oral Tab Take 1 tablet po daily on Monday through Friday 90 tablet 3    Blood Glucose Monitoring Suppl (CONTOUR NEXT MONITOR) w/Device Does not apply Kit 1 each by Does not apply route daily. 1 kit 0    Glucose Blood (CONTOUR NEXT TEST) In Vitro Strip Use to test 3 times daily 300 each 1    Microlet Lancets Does not apply Misc Use to test 3 times daily 300 each 1    Glucose Blood (ONETOUCH VERIO) In Vitro Strip Test 3 times. Type 2 insulin dependant diabetic, E11.65 300 strip 3    Blood Glucose Monitoring Suppl (ONETOUCH VERIO) w/Device Does not apply Kit 1 each by Does not apply route 3 (three) times daily. Type 2 insulin dependant diabetic, E11.65 1 kit 0    OneTouch Delica Lancets 33G Does not apply Misc Test 3 times a day. Type 2 insulin dependant diabetic, E11.65 300 each 3    atorvastatin 10 MG Oral Tab Take 1 tablet (10 mg total) by mouth daily. 90 tablet 3    levothyroxine 50 MCG Oral Tab Take 1 tablet po daily on Saturday and Sunday 90 tablet 1    Albuterol Sulfate  (90 Base) MCG/ACT Inhalation Aero Soln Inhale 1 puff into the lungs every 6 (six) hours as needed for Wheezing.      fluticasone-salmeterol 500-50 MCG/DOSE Inhalation Aerosol Powder, Breath Activated Inhale 2 puffs into the lungs daily.      Montelukast Sodium 10 MG Oral Tab Take 1 tablet (10 mg total) by mouth daily.        Past Medical History:    Abnormal celiac antibody panel    +TTG    Alcohol abuse    Alcoholic cirrhosis ([facility])    quit drinking    Allergic rhinitis    Asthma    managed by her allergist    Asthma ([facility])    recently using inhaler more since recent cold    Autoimmune disease NEC    scleroderma- in remission     Diabetes ([facility])    Diabetes mellitus ([facility])    Disorder of thyroid    Elevated lipids    Elevated liver enzymes    Encounter for insertion of Mirena  IUD    per pt    Esophageal reflux    Esophageal varices (HCC)    Gout    High blood pressure    High cholesterol    History of recent hospitalization     after urgent care stop- / paracentesis    HYPOTHYROIDISM    Hypothyroidism    Insomnia, unspecified    Iron deficiency anemia refractory to iron therapy    Iron deficiency anemia refractory to iron therapy    Obesity, unspecified    Patient has active power of  for health care    scanned in media tab    Pneumonia due to organism    Portal hypertensive gastropathy (HCC)    Thyroid disease    Visual impairment      Past Surgical History:   Procedure Laterality Date      ,1997    x 2    Colonoscopy  2017    diverticulosis    Egd  2017    Grade 1 esophageal varices, portal hypertensive gastropathy, SB Bx taken    Hysterectomy      Laparoscopy procedure unlisted      ovarian cyst    Upper gi endoscopy performed  ,     Grade 2 varices, portal gastropathy      Family History   Problem Relation Age of Onset    Diabetes Father     Diabetes Mother     Heart Disorder Mother         cabg    Heart Disorder Maternal Grandmother     Cancer Maternal Grandmother         small intestine cancer    Lipids Sister     Diabetes Brother         diet controlled diabetes    Breast Cancer Cousin 50        dx age 50    Breast Cancer Paternal Aunt 55        dx age 55    Heart Disease Paternal Grandfather     Hypertension Paternal Grandfather     Hypertension Paternal Grandmother     Stroke Neg       Social History     Socioeconomic History    Marital status:      Spouse name: Lalo    Number of children: 2   Occupational History    Occupation: Teacher, [place], special ed     Employer: [employer]   Tobacco Use    Smoking status: Never    Smokeless tobacco: Never   Vaping Use    Vaping status: Never Used   Substance and Sexual Activity    Alcohol use: Not Currently     Alcohol/week: 0.0 standard drinks of alcohol      Comment: former alcohol abuse- Not drinking since 5/2019    Drug use: Never     Comment: last drink was 3 years ago    Sexual activity: Yes     Partners: Male     Birth control/protection: I.U.D.   Other Topics Concern    Self-Exams No           REVIEW OF SYSTEMS:     10 point ROS completed and was negative, except for pertinent positive and negatives stated in subjective.       EXAM:   GENERAL: well developed, well nourished, in no apparent distress  EXTREMITIES:  1. Integument: The patient's nails appear incurvated, elongated, and dystrophic. Skin appears moist, warm, and supple with positive hair growth. There are no color changes. No open lesions. No macerations. No Hyperkeratotic lesions.   2. Vascular: Pedal pulses are palpable, capillary refill normal. Pt has 3+ pitting edema to bilateral legs and feet.  3. Neurological: Gross sensation intact via light touch bilaterally.  Normal sharp/dull sensation.   4. Musculoskeletal: All muscle groups are graded 5/5 in the foot and ankle. Flexion contracture of lesser digits.            ASSESSMENT AND PLAN:   Diagnoses and all orders for this visit:    Diabetic polyneuropathy associated with type 2 diabetes mellitus (HCC)    Hammer toes of both feet    Onychomycosis    Ingrown nail    Edema of lower extremity      Plan:     -Patient examined, chart history reviewed.  - Discussed importance of proper pedal hygiene, regular foot checks, and tight glucose control by following diet and medication regimen.   - Pt was educated to keep her feet clean; ensure that she washes and dries between toes.  - Patient to avoid walking barefoot. Recommend ambulating with supportive shoes and inserts. Continue use of compression stockings.  -Educated patient on acute signs of infection.  Advised patient to seek immediate medical attention if any concerns arise.  - All of the patient's questions and concerns were addressed.  They indicated their understanding of these issues and agrees to  the plan.    Time spent reviewing pertinent information from patient's chart, reviewing any pertinent imaging, obtaining history and physical exam, discussing and mutually agreeing on a treatment plan, and documenting encounter: 20 minutes    RTC 2-3 months      MARLENE Leal, 03/11/25, 1:01 PM     [facility]            Dragon speech recognition software was used to prepare this note.  Errors in word recognition may occur.  Please contact me with any questions/concerns with this note.

## 2025-04-08 ENCOUNTER — OFFICE VISIT (OUTPATIENT)
Dept: PODIATRY CLINIC | Facility: CLINIC | Age: 65
End: 2025-04-08

## 2025-04-08 DIAGNOSIS — E11.42 DIABETIC POLYNEUROPATHY ASSOCIATED WITH TYPE 2 DIABETES MELLITUS (HCC): ICD-10-CM

## 2025-04-08 DIAGNOSIS — L60.0 INGROWN NAIL: Primary | ICD-10-CM

## 2025-04-08 DIAGNOSIS — M20.41 HAMMER TOES OF BOTH FEET: ICD-10-CM

## 2025-04-08 DIAGNOSIS — M20.42 HAMMER TOES OF BOTH FEET: ICD-10-CM

## 2025-04-08 PROCEDURE — 99212 OFFICE O/P EST SF 10 MIN: CPT

## 2025-04-08 NOTE — PROGRESS NOTES
[facility]  Progress Note    Lawanda Simms is a 64 year old female.   Chief Complaint   Patient presents with    Follow - Up     Ingrown toenails on both feet, second toes, denies pain          HPI:     Patient is a pleasant 63-year-old female who presents to clinic for concern of ingrown nails to bilateral borders of bilateral second digits. Pt states that pain has increasingly gotten worse especially when area is touched and would like to discuss options. Denies any swelling or drainage but states that the areas are reddened. Denies any constitutional symptoms. Her last hemoglobin A1c was 5.9% on 4/4/2025.  No other complaints are mentioned.      Allergies: Clindamycin, Sulfamethoxazole w/trimethoprim, and Lexapro [escitalopram]   Current Outpatient Medications   Medication Sig Dispense Refill    mycophenolate mofetil 250 MG Oral Cap Take 3 capsules (750 mg total) by mouth 2 (two) times daily.      tacrolimus 1 MG Oral Cap Take 4 capsules (4 mg total) by mouth 2 (two) times daily.      ONDANSETRON 4 MG Oral Tablet Dispersible PLACE 1 TABLET ON THE TONGUE EVERY 8 HOURS AS NEEDED FOR NAUSEA 20 tablet 5    Insulin Aspart Pen (NOVOLOG FLEXPEN) 100 UNIT/ML Subcutaneous Solution Pen-injector To inject if glucose over 200. 201-250 3 units, 251-300 4 units, 301-356 5 units, >357 6 units 15 mL 1    TRULICITY 1.5 MG/0.5ML Subcutaneous Solution Pen-injector once a week. On sundays      SPIRIVA RESPIMAT 1.25 MCG/ACT Inhalation Aero Soln Inhale 2 puffs into the lungs daily.      TRESIBA FLEXTOUCH 100 UNIT/ML Subcutaneous Solution Pen-injector Inject 60-61 Units into the skin daily. 60 mL 0    propranolol 20 MG Oral Tab Take 1 tablet (20 mg total) by mouth daily. 90 tablet 1    Insulin Pen Needle (BD PEN NEEDLE STEVEN U/F) 32G X 4 MM Does not apply Misc For insulin use once a day (may substitute with formulary brand) 100 each 3    levothyroxine 75 MCG Oral Tab Take 1 tablet po daily on Monday through  Friday 90 tablet 3    Blood Glucose Monitoring Suppl (CONTOUR NEXT MONITOR) w/Device Does not apply Kit 1 each by Does not apply route daily. 1 kit 0    Glucose Blood (CONTOUR NEXT TEST) In Vitro Strip Use to test 3 times daily 300 each 1    Microlet Lancets Does not apply Misc Use to test 3 times daily 300 each 1    Glucose Blood (ONETOUCH VERIO) In Vitro Strip Test 3 times. Type 2 insulin dependant diabetic, E11.65 300 strip 3    Blood Glucose Monitoring Suppl (ONETOUCH VERIO) w/Device Does not apply Kit 1 each by Does not apply route 3 (three) times daily. Type 2 insulin dependant diabetic, E11.65 1 kit 0    OneTouch Delica Lancets 33G Does not apply Misc Test 3 times a day. Type 2 insulin dependant diabetic, E11.65 300 each 3    atorvastatin 10 MG Oral Tab Take 1 tablet (10 mg total) by mouth daily. 90 tablet 3    levothyroxine 50 MCG Oral Tab Take 1 tablet po daily on Saturday and Sunday 90 tablet 1    Albuterol Sulfate  (90 Base) MCG/ACT Inhalation Aero Soln Inhale 1 puff into the lungs every 6 (six) hours as needed for Wheezing.      fluticasone-salmeterol 500-50 MCG/DOSE Inhalation Aerosol Powder, Breath Activated Inhale 2 puffs into the lungs daily.      Montelukast Sodium 10 MG Oral Tab Take 1 tablet (10 mg total) by mouth daily.        Past Medical History:    Abnormal celiac antibody panel    +TTG    Alcohol abuse    Alcoholic cirrhosis (HCC)    quit drinking    Allergic rhinitis    Asthma    managed by her allergist    Asthma ([facility])    recently using inhaler more since recent cold    Autoimmune disease NEC    scleroderma- in remission     Diabetes (HCC)    Diabetes mellitus (HCC)    Disorder of thyroid    Elevated lipids    Elevated liver enzymes    Encounter for insertion of Mirena IUD    per pt    Esophageal reflux    Esophageal varices ([facility])    Gout    High blood pressure    High cholesterol    History of recent hospitalization    11/13 after urgent care stop- / paracentesis    HYPOTHYROIDISM     Hypothyroidism    Insomnia, unspecified    Iron deficiency anemia refractory to iron therapy    Iron deficiency anemia refractory to iron therapy    Obesity, unspecified    Patient has active power of  for health care    scanned in media tab    Pneumonia due to organism    Portal hypertensive gastropathy (HCC)    Thyroid disease    Visual impairment      Past Surgical History:   Procedure Laterality Date      ,1997    x 2    Colonoscopy  2017    diverticulosis    Egd  2017    Grade 1 esophageal varices, portal hypertensive gastropathy, SB Bx taken    Hysterectomy      Laparoscopy procedure unlisted      ovarian cyst    Upper gi endoscopy performed  ,     Grade 2 varices, portal gastropathy      Family History   Problem Relation Age of Onset    Diabetes Father     Diabetes Mother     Heart Disorder Mother         cabg    Heart Disorder Maternal Grandmother     Cancer Maternal Grandmother         small intestine cancer    Lipids Sister     Diabetes Brother         diet controlled diabetes    Breast Cancer Cousin 50        dx age 50    Breast Cancer Paternal Aunt 55        dx age 55    Heart Disease Paternal Grandfather     Hypertension Paternal Grandfather     Hypertension Paternal Grandmother     Stroke Neg       Social History     Socioeconomic History    Marital status:      Spouse name: Lalo    Number of children: 2   Occupational History    Occupation: Teacher, [employer], special ed     Employer: [employer] [address]   Tobacco Use    Smoking status: Never    Smokeless tobacco: Never   Vaping Use    Vaping status: Never Used   Substance and Sexual Activity    Alcohol use: Not Currently     Alcohol/week: 0.0 standard drinks of alcohol     Comment: former alcohol abuse- Not drinking since 2019    Drug use: Never     Comment: last drink was 3 years ago    Sexual activity: Yes     Partners: Male     Birth control/protection: I.U.D.   Other Topics Concern     Self-Exams No           REVIEW OF SYSTEMS:     10 point ROS completed and was negative, except for pertinent positive and negatives stated in subjective.       EXAM:   GENERAL: well developed, well nourished, in no apparent distress  EXTREMITIES:  1. Integument: The patient's bilateral borders of bilateral second digits nails appear incurvated and ingrown.There is slight redness to the area but no drainage or signs of infection.   2. Vascular: Pedal pulses are palpable, capillary refill normal. Pt has 3+ pitting edema to bilateral legs and feet.  3. Neurological: Gross sensation intact via light touch bilaterally.  Normal sharp/dull sensation.   4. Musculoskeletal: All muscle groups are graded 5/5 in the foot and ankle. Flexion contracture of lesser digits.            ASSESSMENT AND PLAN:   Diagnoses and all orders for this visit:    Ingrown nail    Hammer toes of both feet    Diabetic polyneuropathy associated with type 2 diabetes mellitus (HCC)        Plan:     -Patient examined, chart history reviewed.  -At today's visit was able to perform slant back procedure to ingrown nails on bilateral second digits. Patient verbalized relief of pain post procedure. Nails further smoothed with dremel.   - Patient to avoid walking barefoot. Recommend ambulating with supportive shoes and inserts; today patient is ambulating in Birkenstocks. Continue use of compression stockings.  -Educated patient on acute signs of infection.  Advised patient to seek immediate medical attention if any concerns arise.  - All of the patient's questions and concerns were addressed.  They indicated their understanding of these issues and agrees to the plan.    Time spent reviewing pertinent information from patient's chart, reviewing any pertinent imaging, obtaining history and physical exam, discussing and mutually agreeing on a treatment plan, and documenting encounter: 20 minutes    RTC 2-3 months or sooner if nails start to become ingrown  again      MARLENE Leal, 03/11/25, 1:01 PM     [facility]            Dragon speech recognition software was used to prepare this note.  Errors in word recognition may occur.  Please contact me with any questions/concerns with this note.

## 2025-05-19 ENCOUNTER — OFFICE VISIT (OUTPATIENT)
Dept: PODIATRY CLINIC | Facility: CLINIC | Age: 65
End: 2025-05-19

## 2025-05-19 DIAGNOSIS — R60.0 EDEMA OF LOWER EXTREMITY: ICD-10-CM

## 2025-05-19 DIAGNOSIS — L60.0 INGROWN NAIL: Primary | ICD-10-CM

## 2025-05-19 DIAGNOSIS — M20.42 HAMMER TOES OF BOTH FEET: ICD-10-CM

## 2025-05-19 DIAGNOSIS — B35.1 ONYCHOMYCOSIS: ICD-10-CM

## 2025-05-19 DIAGNOSIS — E11.42 DIABETIC POLYNEUROPATHY ASSOCIATED WITH TYPE 2 DIABETES MELLITUS (HCC): ICD-10-CM

## 2025-05-19 DIAGNOSIS — M20.41 HAMMER TOES OF BOTH FEET: ICD-10-CM

## 2025-05-19 PROCEDURE — 99213 OFFICE O/P EST LOW 20 MIN: CPT

## 2025-05-19 NOTE — PROGRESS NOTES
[facility]  Progress Note    Lawanda Simms is a 64 year old female.   Chief Complaint   Patient presents with    Nail, Ingrown     Patient states that slant back technique- States that treatment was very helpful. Left is worse than right. Patient here for re-trim. Rates pain at 5/10.          HPI:     Patient is a pleasant 63-year-old female who presents to clinic for concern of ingrown nails to bilateral borders of bilateral second digits and right hallux. Pt states that pain has increasingly gotten worse especially when area is touched and would like to discuss options. Denies any swelling or drainage but states that the areas are reddened. Denies any constitutional symptoms. Her last hemoglobin A1c was 5.9% on 4/4/2025.  Mentions that she has been on Prednisone for about a month due to \"signs of liver rejection\" which has caused increased swelling to lower extremities. No other complaints are mentioned.      Allergies: Clindamycin, Sulfamethoxazole w/trimethoprim, and Lexapro [escitalopram]   Current Outpatient Medications   Medication Sig Dispense Refill    mycophenolate mofetil 250 MG Oral Cap Take 3 capsules (750 mg total) by mouth 2 (two) times daily.      tacrolimus 1 MG Oral Cap Take 4 capsules (4 mg total) by mouth 2 (two) times daily.      ONDANSETRON 4 MG Oral Tablet Dispersible PLACE 1 TABLET ON THE TONGUE EVERY 8 HOURS AS NEEDED FOR NAUSEA 20 tablet 5    Insulin Aspart Pen (NOVOLOG FLEXPEN) 100 UNIT/ML Subcutaneous Solution Pen-injector To inject if glucose over 200. 201-250 3 units, 251-300 4 units, 301-356 5 units, >357 6 units 15 mL 1    TRULICITY 1.5 MG/0.5ML Subcutaneous Solution Pen-injector once a week. On sundays      SPIRIVA RESPIMAT 1.25 MCG/ACT Inhalation Aero Soln Inhale 2 puffs into the lungs daily.      TRESIBA FLEXTOUCH 100 UNIT/ML Subcutaneous Solution Pen-injector Inject 60-61 Units into the skin daily. 60 mL 0    propranolol 20 MG Oral Tab Take 1 tablet (20 mg  total) by mouth daily. 90 tablet 1    Insulin Pen Needle (BD PEN NEEDLE STEVEN U/F) 32G X 4 MM Does not apply Misc For insulin use once a day (may substitute with formulary brand) 100 each 3    levothyroxine 75 MCG Oral Tab Take 1 tablet po daily on Monday through Friday 90 tablet 3    Blood Glucose Monitoring Suppl (CONTOUR NEXT MONITOR) w/Device Does not apply Kit 1 each by Does not apply route daily. 1 kit 0    Glucose Blood (CONTOUR NEXT TEST) In Vitro Strip Use to test 3 times daily 300 each 1    Microlet Lancets Does not apply Misc Use to test 3 times daily 300 each 1    Glucose Blood (ONETOUCH VERIO) In Vitro Strip Test 3 times. Type 2 insulin dependant diabetic, E11.65 300 strip 3    Blood Glucose Monitoring Suppl (ONETOUCH VERIO) w/Device Does not apply Kit 1 each by Does not apply route 3 (three) times daily. Type 2 insulin dependant diabetic, E11.65 1 kit 0    OneTouch Delica Lancets 33G Does not apply Misc Test 3 times a day. Type 2 insulin dependant diabetic, E11.65 300 each 3    atorvastatin 10 MG Oral Tab Take 1 tablet (10 mg total) by mouth daily. 90 tablet 3    levothyroxine 50 MCG Oral Tab Take 1 tablet po daily on Saturday and Sunday 90 tablet 1    Albuterol Sulfate  (90 Base) MCG/ACT Inhalation Aero Soln Inhale 1 puff into the lungs every 6 (six) hours as needed for Wheezing.      fluticasone-salmeterol 500-50 MCG/DOSE Inhalation Aerosol Powder, Breath Activated Inhale 2 puffs into the lungs daily.      Montelukast Sodium 10 MG Oral Tab Take 1 tablet (10 mg total) by mouth daily.        Past Medical History:    Abnormal celiac antibody panel    +TTG    Alcohol abuse    Alcoholic cirrhosis ([facility])    quit drinking    Allergic rhinitis    Asthma    managed by her allergist    Asthma ([facility])    recently using inhaler more since recent cold    Autoimmune disease NEC    scleroderma- in remission     Diabetes (HCC)    Diabetes mellitus ([facility])    Disorder of thyroid    Elevated lipids    Elevated liver  enzymes    Encounter for insertion of Mirena IUD    per pt    Esophageal reflux    Esophageal varices (HCC)    Gout    High blood pressure    High cholesterol    History of recent hospitalization     after urgent care stop- / paracentesis    HYPOTHYROIDISM    Hypothyroidism    Insomnia, unspecified    Iron deficiency anemia refractory to iron therapy    Iron deficiency anemia refractory to iron therapy    Obesity, unspecified    Patient has active power of  for health care    scanned in media tab    Pneumonia due to organism    Portal hypertensive gastropathy (HCC)    Thyroid disease    Visual impairment      Past Surgical History:   Procedure Laterality Date      ,1997    x 2    Colonoscopy  2017    diverticulosis    Egd  2017    Grade 1 esophageal varices, portal hypertensive gastropathy, SB Bx taken    Hysterectomy      Laparoscopy procedure unlisted      ovarian cyst    Upper gi endoscopy performed  ,     Grade 2 varices, portal gastropathy      Family History   Problem Relation Age of Onset    Diabetes Father     Diabetes Mother     Heart Disorder Mother         cabg    Heart Disorder Maternal Grandmother     Cancer Maternal Grandmother         small intestine cancer    Lipids Sister     Diabetes Brother         diet controlled diabetes    Breast Cancer Cousin 50        dx age 50    Breast Cancer Paternal Aunt 55        dx age 55    Heart Disease Paternal Grandfather     Hypertension Paternal Grandfather     Hypertension Paternal Grandmother     Stroke Neg       Social History     Socioeconomic History    Marital status:      Spouse name: Lalo    Number of children: 2   Occupational History    Occupation: Teacher, [place], special ed     Employer: [employer] [address]   Tobacco Use    Smoking status: Never    Smokeless tobacco: Never   Vaping Use    Vaping status: Never Used   Substance and Sexual Activity    Alcohol use: Not Currently      Alcohol/week: 0.0 standard drinks of alcohol     Comment: former alcohol abuse- Not drinking since 5/2019    Drug use: Never     Comment: last drink was 3 years ago    Sexual activity: Yes     Partners: Male     Birth control/protection: I.U.D.   Other Topics Concern    Self-Exams No           REVIEW OF SYSTEMS:     10 point ROS completed and was negative, except for pertinent positive and negatives stated in subjective.       EXAM:   GENERAL: well developed, well nourished, in no apparent distress  EXTREMITIES:  1. Integument: The patient's bilateral borders of bilateral second digits, and bilateral hallux nails appear incurvated and ingrown.There is slight redness to the area but no drainage or signs of infection.   2. Vascular: Pedal pulses are palpable, capillary refill normal. Pt has 3+ pitting edema to bilateral legs and feet.  3. Neurological: Gross sensation intact via light touch bilaterally.  Normal sharp/dull sensation.   4. Musculoskeletal: All muscle groups are graded 5/5 in the foot and ankle. Flexion contracture of lesser digits.            ASSESSMENT AND PLAN:   Diagnoses and all orders for this visit:    Ingrown nail    Hammer toes of both feet    Onychomycosis    Diabetic polyneuropathy associated with type 2 diabetes mellitus (HCC)    Edema of lower extremity      Plan:     -Patient examined, chart history reviewed.  -At today's visit was able to perform slant back procedure to ingrown nails on bilateral second digits and bilateral borders of bilateral hallux. Patient verbalized relief of pain post procedure. Nails further smoothed with dremel.   - Patient to avoid walking barefoot. Recommend ambulating with supportive shoes and inserts; today patient is ambulating in Birkenstocks. Continue use of compression stockings.  -Educated patient on acute signs of infection.  Advised patient to seek immediate medical attention if any concerns arise.  - All of the patient's questions and concerns were  addressed.  They indicated their understanding of these issues and agrees to the plan.    Time spent reviewing pertinent information from patient's chart, reviewing any pertinent imaging, obtaining history and physical exam, discussing and mutually agreeing on a treatment plan, and documenting encounter: 20 minutes    RTC 2-3 months or sooner if nails start to become ingrown again      MARLENE Leal, 05/19/25, 2:33 PM      [facility]            Dragon speech recognition software was used to prepare this note.  Errors in word recognition may occur.  Please contact me with any questions/concerns with this note.

## 2025-07-30 ENCOUNTER — OFFICE VISIT (OUTPATIENT)
Facility: LOCATION | Age: 65
End: 2025-07-30
Payer: COMMERCIAL

## 2025-07-30 DIAGNOSIS — L60.0 ONYCHOCRYPTOSIS: ICD-10-CM

## 2025-07-30 DIAGNOSIS — M20.41 HAMMER TOES OF BOTH FEET: ICD-10-CM

## 2025-07-30 DIAGNOSIS — E11.42 DIABETIC POLYNEUROPATHY ASSOCIATED WITH TYPE 2 DIABETES MELLITUS (HCC): Primary | ICD-10-CM

## 2025-07-30 DIAGNOSIS — B35.1 ONYCHOMYCOSIS: ICD-10-CM

## 2025-07-30 DIAGNOSIS — M20.42 HAMMER TOES OF BOTH FEET: ICD-10-CM

## 2025-07-30 DIAGNOSIS — L60.3 NAIL DYSTROPHY: ICD-10-CM

## 2025-07-30 PROCEDURE — 99213 OFFICE O/P EST LOW 20 MIN: CPT

## 2025-07-30 RX ORDER — SULFAMETHOXAZOLE AND TRIMETHOPRIM 400; 80 MG/1; MG/1
TABLET ORAL
COMMUNITY
Start: 2025-07-11

## (undated) DIAGNOSIS — K21.9 GASTROESOPHAGEAL REFLUX DISEASE, UNSPECIFIED WHETHER ESOPHAGITIS PRESENT: Primary | ICD-10-CM

## (undated) DEVICE — 3M™ RED DOT™ MONITORING ELECTRODE WITH FOAM TAPE AND STICKY GEL, 50/BAG, 20/CASE, 72/PLT 2570: Brand: RED DOT™

## (undated) DEVICE — ENDOSCOPY PACK UPPER: Brand: MEDLINE INDUSTRIES, INC.

## (undated) DEVICE — 1200CC GUARDIAN II: Brand: GUARDIAN

## (undated) DEVICE — Device: Brand: DEFENDO AIR/WATER/SUCTION AND BIOPSY VALVE

## (undated) DEVICE — FILTERLINE NASAL ADULT O2/CO2

## (undated) NOTE — LETTER
AUTHORIZATION FOR SURGICAL OPERATION OR OTHER PROCEDURE    1. I hereby authorize Dr. Gillian Cedeno, and [place] [facility] staff assigned to my case to perform the following operation and/or procedure at the [facility], [place], [facility]:    _______________________________________________________________________________________________    Naill Avulsion Left Hallux  _______________________________________________________________________________________________    2. My physician has explained the nature and purpose of the operation or other procedure, possible alternative methods of treatment, the risks involved, and the possibility of complication to me. I acknowledge that no guarantee has been made as to the result that may be obtained. 3.  I recognize that, during the course of this operation, or other procedure, unforseen conditions may necessitate additional or different procedure than those listed above. I, therefore, further authorize and request that the above named physician, his/her physician assistants or designees perform such procedures as are, in his/her professional opinion, necessary and desirable. 4.  Any tissue or organs removed in the operation or other procedure may be disposed of by and at the discretion of the [facility], [place], [facility] and [facility] AT [facility]. 5.  I understand that in the event of a medical emergency, I will be transported by local paramedics to [facility] or other hospital emergency department. 6.  I certify that I have read and fully understand the above consent to operation and/or other procedure. 7.  I acknowledge that my physician has explained sedation/analgesia administration to me including the risks and benefits. I consent to the administration of sedation/analgesia as may be necessary or desirable in the judgement of my physician.     Witness signature: ___________________________________________________ Date:  ______/______/_____                    Time: ________ A. M.  P.M. Patient Name:  ______________________________________________________  (please print)      Patient signature:  ___________________________________________________             Relationship to Patient:           []  Parent    Responsible person                          []  Spouse  In case of minor or                    [] Other  _____________   Incompetent name:  __________________________________________________                               (please print)      _____________      Responsible person  In case of minor or  Incompetent signature:  _______________________________________________    Statement of Physician  My signature below affirms that prior to the time of the procedure, I have explained to the patient and/or his/her guardian, the risks and benefits involved in the proposed treatment and any reasonable alternative to the proposed treatment. I have also explained the risks and benefits involved in the refusal of the proposed treatment and have answered the patient's questions.                         Date:  ______/______/_______  Provider                      Signature:  __________________________________________________________       Time:  ___________ A.M    P.M.

## (undated) NOTE — LETTER
Drea Silva [address]  [place], [address] [place] [address]    Consent for Operation  Date: __________________                                Time: _______________    1.  I authorize the performance upon Sumanth Mendez the following operation:  Procedur procedure has been videotaped, the surgeon will obtain the original videotape. The hospital will not be responsible for storage or maintenance of this tape.   7. For the purpose of advancing medical education, I consent to the admittance of observers to the STATEMENTS REQUIRING INSERTION OR COMPLETION WERE FILLED IN.     Signature of Patient:   ___________________________    When the patient is a minor or mentally incompetent to give consent:  Signature of person authorized to consent for patient: ____________ supplements, and pills I can buy without a prescription (including street drugs/illegal medications). Failure to inform my anesthesiologist about these medicines may increase my risk of anesthetic complications. iv.  If I am allergic to anything or have ha Anesthesiologist Signature     Date   Time  I have discussed the procedure and information above with the patient (or patient’s representative) and answered their questions. The patient or their representative has agreed to have anesthesia services.     ___

## (undated) NOTE — IP AVS SNAPSHOT
[address]            (For Outpatient Use Only) Initial Admit Date: 7/8/2019   Inpt/Obs Admit Date: Inpt: 7/8/19 / Obs: N/A   Discharge Date:    Gallo Rivera:  [de-identified]   MRN: [de-identified]   CSN: [id number]   CEID: [id number] Hospital Account Financial Class: [facility]    July 10, 2019

## (undated) NOTE — IP AVS SNAPSHOT
Patient Demographics     Address  [address] [id number] Phone  [phone] [facility]) *Preferred*  [phone] [facility]) E-mail Address  [email or web address]      Emergency Contact(s)     Name Relation Home Work Mobile    Lalo Simms Inhale 1 puff into the lungs every 6 (six) hours as needed for Wheezing. [    ]   [    ]   [    ]   [    ]     atorvastatin 10 MG Tabs  Commonly known as:  LIPITOR      Take 10 mg by mouth daily.     [    ]   [    ]   [    ]   [    ]     Biotin 5 MG Cap to be direct to gastroenterologist.   King Suresh MD   [    ]   [    ]   [    ]   [    ]           Where to Get Your Medications      These medications were sent to [address] [place], [place] [address]Tony 4, [phone] Most Recent Value   Vitals  108/48 Filed at 07/10/2019 1300   Pulse  68 Filed at 07/10/2019 1400   Resp  17 Filed at 07/10/2019 1400   Temp  98.4 °F (36.9 °C) Filed at 07/10/2019 1145   SpO2  99 % Filed at 07/10/2019 1400      Patient's Most Recent Weight GFR, Non- 97 >=60 — [facility]   GFR, -American 112 >=60 — [facility]            CBC WITH DIFFERENTIAL WITH PLATELET [[id number]]  Resulted: 07/10/19 0448, Result status: Final result   Ordering provider:  MARLENE Angel  07/09/1 :  Carolina Ivey MD (Physician)       [facility] Hospitalist History and Physical      Patient presents with:  GI Bleeding (gastrointestinal)       PCP: Krystal Serrano MD      History of Present Illness: Patient is a 63year old female wit • Heart Disorder Mother         cabg   • Heart Disorder Maternal Grandmother    • Cancer Maternal Grandmother         small intestine cancer   • Lipids Sister    • Diabetes Brother         diet controlled diabetes   • Breast Cancer Cousin 48        dx age  07/08/2019    CA 9.6 07/08/2019    ALB 2.7 07/08/2019    ALKPHO 135 07/08/2019    BILT 1.7 07/08/2019    TP 6.6 07/08/2019    AST 47 07/08/2019    ALT 30 07/08/2019       CXR: image personally reviewed. Radiology: No results found.      Assess Admission Date:  7/8/2019  Consultation Date:  7/8/2019      Reason for consultation: Mgmt of Type 2 DM    Chief Complaint:  Admitted for GI bleed    History of Present Illness: This is a 63year old[SB.1] [SB.2] female[SB. 1] with Type 2 DM[SB. All other review of systems is negative        Past Medical History:   Diagnosis Date   • Abnormal celiac antibody panel 07/2017    +TTG   • Asthma     managed by her allergist   • Asthma    • Autoimmune disease NEC     scleroderma   • Diabetes ([facility] [address] 06/20 total) by mouth once daily. Disp: 90 tablet Rfl: 3   Levonorgestrel (MIRENA, 52 MG, IU) by Intrauterine route.  Disp:  Rfl:[SB.1]          CURRENT MEDICATIONS[SB.2]    [COMPLETED] sodium chloride 0.9% IV bolus 1,000 mL 1,000 mL Intravenous Once   [COMPLETED Occupational History      Occupation: Teacher, [place], special ed        Employer: [employer] [address]    Tobacco Use      Smoking status: Never Smoker      Smokeless tobacco: Never Used    Substance and Sexual Activity      Alcohol use: Not C >=45 mg/dL 69   Risk Factor      <5.0 2.7   Cholesterol, Total      <=200 mg/dL 186   LDL Cholesterol Calc      <130 mg/dL 97         Component      Latest Ref Rng & Units 7/9/2018   TSH      0.350 - 5.500 uIU/mL 4.098           Component      Latest R · Discussed that due to poor glycemic control and limited oral agent options, she should begin insulin. Pt is agreeable to this. Will start insulin teaching.   · Pt's diabetes is managed by Dr. Jose Alfredo Cardona  · Home diabetic regimen is metformin and [facility] team.  For all other patients, please follow usual protocol for discharge care transition.     Secondary Diagnoses: See chart    Risk of readmission: Mor Rodriguez has Moderate Risk of readmission after discharge from the hospital.    Important Follow up Patient instructions:      Current Discharge Medication List    START taking these medications    Pantoprazole Sodium 40 MG Oral Tab EC  Take 1 tablet (40 mg total) by mouth every morning before breakfast.    LEVEMIR FLEXTOUCH 100 UNIT/ML Subcutaneous Solu Attribution [employer]    LD. 1 - Enrique Buckley MD on 7/10/2019  4:35 PM                     Physical Therapy Notes (last 72 hours) (Notes from 7/7/2019  4:39 PM through 7/10/2019  4:39 PM)    No notes of this type exist for this encounter.      Occupational Ther

## (undated) NOTE — LETTER
Drea Silva [address]  [place], [address] [place] [address]    Consent for Operation  Date: __________________                                Time: _______________    1.  I authorize the performance upon Stephani  the following operation:  Procedur 6. I consent to the photographing or videotaping of the operations or procedures to be performed, including appropriate portions of my body for medical, scientific, or educational purposes, provided my identity is not revealed by the pictures or by descrip 10. If I have a Do Not Resuscitate order in place, the surgeon and I (or the individual authorized to consent on my behalf) will discuss and agree as to whether the Do Not Resuscitate order will remain in effect or will be discontinued during the performan a. Allow the anesthesiologist (anesthesia doctor) to give me medicine and do additional procedures as necessary.  Some examples are: Starting or using an “IV” to give me medicine, fluids or blood during my procedure, and having a breathing tube placed to he 7. Regional Anesthesia (“spinal”, “epidural”, & “nerve blocks”): I understand that rare but potential complications include headache, bleeding, infection, seizure, irregular heart rhythms, and nerve injury.     I can change my mind about having anesthesia

## (undated) NOTE — LETTER
ASTHMA ACTION PLAN for Flako Sherman     : 1960     Date: 11/15/2021  Provider:  MARLENE Guadarrama  Phone for doctor or clinic: [facility], [address], [place] [address] [address], [address] [place] [address]  [phone]

## (undated) NOTE — LETTER
ASTHMA ACTION PLAN for Vivi Morgan     : 1960     Date: 3/11/2022  Provider:  MARLENE Tineo  Phone for doctor or clinic: [address] [facility], [facility], [address]  [address] [place] [address]  Trace Calderon [id number] [phone]    ACT Score: 25      You can use the colors of a traffic light to help learn about your asthma medicines. 1. Green - Go! % of Personal Best Peak Flow Use controller medicine. Breathing is good  No cough or wheeze  Can work and play Medicine How much to take When to take it    Montelukast 10mg 1 tablet by mouth daily  Fluticasone-Salmeterol 500-50mcg/dose 1 puff into the lungs as needed  Spiriva Respimat 1.25mcg/act 2 puffs into the lungs daily      2. Yellow - Caution. 50-79% Personal Best Peak  Flow. Use reliever medicine to keep an asthma attack from getting bad. Cough  Wheezing  Tight Chest  Wake up at night Medicine How much to take When to take it    Albuterol HFA 1 puffs into the lungs every 6 hours as needed for wheezing       Additional instructions         3. Red - Stop! Danger!  <50% Personal Best Peak  Flow. Take these medications until  Get help from a doctor   Medicine not helping  Breathing is hard and fast  Nose opens wide  Can't walk  Ribs show  Can't talk well Medicine How much to take When to take it    Albuterol HFA  2 puffs NOW     Additional Instructions If your symptoms do not improve and you cannot contact your doctor, [facility] or call 911 immediately! [x] Asthma Action Plan reviewed with patient (and caregiver if necessary) and a copy of the plan was given to the patient/caregiver. [] Asthma Action Plan reviewed with patient (and caregiver if necessary) on the phone and mailed copy to patient or submitted via [address]      Signatures:  Provider  MARLENE Tineo   Patient Caretaker

## (undated) NOTE — LETTER
Drea Silva 182 6 13[facility]  [place], [address] [place] [address]    Consent for Operation  Date: __________________                                Time: _______________    1.  I authorize the performance upon Mor Rodriguez the following operation:  Procedur procedure has been videotaped, the surgeon will obtain the original videotape. The hospital will not be responsible for storage or maintenance of this tape.   7. For the purpose of advancing medical education, I consent to the admittance of observers to the STATEMENTS REQUIRING INSERTION OR COMPLETION WERE FILLED IN.     Signature of Patient:   ___________________________    When the patient is a minor or mentally incompetent to give consent:  Signature of person authorized to consent for patient: ____________ supplements, and pills I can buy without a prescription (including street drugs/illegal medications). Failure to inform my anesthesiologist about these medicines may increase my risk of anesthetic complications. iv.  If I am allergic to anything or have ha Anesthesiologist Signature     Date   Time  I have discussed the procedure and information above with the patient (or patient’s representative) and answered their questions. The patient or their representative has agreed to have anesthesia services.     ___

## (undated) NOTE — ED AVS SNAPSHOT
Diya Barrera   MRN: [id number]    Department:  [facility] Emergency Department   Date of Visit:  5/25/2019           Disclosure     Insurance plans vary and the physician(s) referred by the ER may not be covered by your plan.  Please contact yo tell this physician (or your personal doctor if your instructions are to return to your personal doctor) about any new or lasting problems. The primary care or specialist physician will see patients referred from the [facility] Emergency Department.  Yessenia Bee

## (undated) NOTE — LETTER
Drea Silva 182 6 [address]  [place], [address] [place] [address]    Consent for Operation  Date: __________________                                Time: _______________    1.  I authorize the performance upon Eunice Perera the following operation:  Procedur 6. I consent to the photographing or videotaping of the operations or procedures to be performed, including appropriate portions of my body for medical, scientific, or educational purposes, provided my identity is not revealed by the pictures or by descrip 10. If I have a Do Not Resuscitate order in place, the surgeon and I (or the individual authorized to consent on my behalf) will discuss and agree as to whether the Do Not Resuscitate order will remain in effect or will be discontinued during the performan a. Allow the anesthesiologist (anesthesia doctor) to give me medicine and do additional procedures as necessary.  Some examples are: Starting or using an “IV” to give me medicine, fluids or blood during my procedure, and having a breathing tube placed to he 7. Regional Anesthesia (“spinal”, “epidural”, & “nerve blocks”): I understand that rare but potential complications include headache, bleeding, infection, seizure, irregular heart rhythms, and nerve injury.     I can change my mind about having anesthesia